# Patient Record
Sex: FEMALE | Race: WHITE | NOT HISPANIC OR LATINO | URBAN - METROPOLITAN AREA
[De-identification: names, ages, dates, MRNs, and addresses within clinical notes are randomized per-mention and may not be internally consistent; named-entity substitution may affect disease eponyms.]

---

## 2017-04-25 ENCOUNTER — OUTPATIENT (OUTPATIENT)
Dept: OUTPATIENT SERVICES | Facility: HOSPITAL | Age: 77
LOS: 1 days | Discharge: HOME | End: 2017-04-25

## 2017-04-25 DIAGNOSIS — F17.211 NICOTINE DEPENDENCE, CIGARETTES, IN REMISSION: ICD-10-CM

## 2017-06-28 DIAGNOSIS — C34.90 MALIGNANT NEOPLASM OF UNSPECIFIED PART OF UNSPECIFIED BRONCHUS OR LUNG: ICD-10-CM

## 2018-03-28 ENCOUNTER — OUTPATIENT (OUTPATIENT)
Dept: OUTPATIENT SERVICES | Facility: HOSPITAL | Age: 78
LOS: 1 days | Discharge: HOME | End: 2018-03-28

## 2018-03-28 DIAGNOSIS — Z12.31 ENCOUNTER FOR SCREENING MAMMOGRAM FOR MALIGNANT NEOPLASM OF BREAST: ICD-10-CM

## 2018-10-16 ENCOUNTER — INPATIENT (INPATIENT)
Facility: HOSPITAL | Age: 78
LOS: 3 days | Discharge: NURSING, REHAB OR LONG-TERM CARE | End: 2018-10-20
Attending: HOSPITALIST | Admitting: HOSPITALIST

## 2018-10-16 VITALS
WEIGHT: 130.07 LBS | OXYGEN SATURATION: 87 % | RESPIRATION RATE: 20 BRPM | SYSTOLIC BLOOD PRESSURE: 174 MMHG | HEIGHT: 63 IN | TEMPERATURE: 97 F | DIASTOLIC BLOOD PRESSURE: 81 MMHG | HEART RATE: 100 BPM

## 2018-10-16 LAB
ALBUMIN SERPL ELPH-MCNC: 4.3 G/DL — SIGNIFICANT CHANGE UP (ref 3.5–5.2)
ALP SERPL-CCNC: 73 U/L — SIGNIFICANT CHANGE UP (ref 30–115)
ALT FLD-CCNC: 21 U/L — SIGNIFICANT CHANGE UP (ref 0–41)
ANION GAP SERPL CALC-SCNC: 16 MMOL/L — HIGH (ref 7–14)
AST SERPL-CCNC: 18 U/L — SIGNIFICANT CHANGE UP (ref 0–41)
BILIRUB SERPL-MCNC: 0.9 MG/DL — SIGNIFICANT CHANGE UP (ref 0.2–1.2)
BUN SERPL-MCNC: 19 MG/DL — SIGNIFICANT CHANGE UP (ref 10–20)
CALCIUM SERPL-MCNC: 9.6 MG/DL — SIGNIFICANT CHANGE UP (ref 8.5–10.1)
CHLORIDE SERPL-SCNC: 98 MMOL/L — SIGNIFICANT CHANGE UP (ref 98–110)
CO2 SERPL-SCNC: 26 MMOL/L — SIGNIFICANT CHANGE UP (ref 17–32)
CREAT SERPL-MCNC: 1 MG/DL — SIGNIFICANT CHANGE UP (ref 0.7–1.5)
GAS PNL BLDA: SIGNIFICANT CHANGE UP
GLUCOSE SERPL-MCNC: 170 MG/DL — HIGH (ref 70–99)
HCT VFR BLD CALC: 45.6 % — SIGNIFICANT CHANGE UP (ref 37–47)
HGB BLD-MCNC: 14.8 G/DL — SIGNIFICANT CHANGE UP (ref 12–16)
MCHC RBC-ENTMCNC: 30.1 PG — SIGNIFICANT CHANGE UP (ref 27–31)
MCHC RBC-ENTMCNC: 32.5 G/DL — SIGNIFICANT CHANGE UP (ref 32–37)
MCV RBC AUTO: 92.9 FL — SIGNIFICANT CHANGE UP (ref 81–99)
NRBC # BLD: 0 /100 WBCS — SIGNIFICANT CHANGE UP (ref 0–0)
NT-PROBNP SERPL-SCNC: 119 PG/ML — SIGNIFICANT CHANGE UP (ref 0–300)
PLATELET # BLD AUTO: 169 K/UL — SIGNIFICANT CHANGE UP (ref 130–400)
POTASSIUM SERPL-MCNC: 3.9 MMOL/L — SIGNIFICANT CHANGE UP (ref 3.5–5)
POTASSIUM SERPL-SCNC: 3.9 MMOL/L — SIGNIFICANT CHANGE UP (ref 3.5–5)
PROT SERPL-MCNC: 6.8 G/DL — SIGNIFICANT CHANGE UP (ref 6–8)
RBC # BLD: 4.91 M/UL — SIGNIFICANT CHANGE UP (ref 4.2–5.4)
RBC # FLD: 12.7 % — SIGNIFICANT CHANGE UP (ref 11.5–14.5)
SODIUM SERPL-SCNC: 140 MMOL/L — SIGNIFICANT CHANGE UP (ref 135–146)
TROPONIN T SERPL-MCNC: 0.01 NG/ML — SIGNIFICANT CHANGE UP
WBC # BLD: 18.6 K/UL — HIGH (ref 4.8–10.8)
WBC # FLD AUTO: 18.6 K/UL — HIGH (ref 4.8–10.8)

## 2018-10-16 RX ORDER — IPRATROPIUM/ALBUTEROL SULFATE 18-103MCG
3 AEROSOL WITH ADAPTER (GRAM) INHALATION ONCE
Qty: 0 | Refills: 0 | Status: COMPLETED | OUTPATIENT
Start: 2018-10-16 | End: 2018-10-16

## 2018-10-16 RX ORDER — TETANUS TOXOID, REDUCED DIPHTHERIA TOXOID AND ACELLULAR PERTUSSIS VACCINE, ADSORBED 5; 2.5; 8; 8; 2.5 [IU]/.5ML; [IU]/.5ML; UG/.5ML; UG/.5ML; UG/.5ML
0.5 SUSPENSION INTRAMUSCULAR ONCE
Qty: 0 | Refills: 0 | Status: COMPLETED | OUTPATIENT
Start: 2018-10-16 | End: 2018-10-16

## 2018-10-16 RX ADMIN — Medication 3 MILLILITER(S): at 19:56

## 2018-10-16 RX ADMIN — Medication 125 MILLIGRAM(S): at 22:42

## 2018-10-16 RX ADMIN — TETANUS TOXOID, REDUCED DIPHTHERIA TOXOID AND ACELLULAR PERTUSSIS VACCINE, ADSORBED 0.5 MILLILITER(S): 5; 2.5; 8; 8; 2.5 SUSPENSION INTRAMUSCULAR at 19:53

## 2018-10-16 NOTE — ED PROVIDER NOTE - SEVERE SEPSIS ALERT DETAILS
Delayed presentation for sepsis : Pt presented for fall laceration hand and leg pain -  later on found to be hypoxic -  further investigation for cause  -  LA 1.9 , afebrile -   pt later on became febrile 10 -  abx started,  ivf given -

## 2018-10-16 NOTE — ED PROVIDER NOTE - CRITICAL CARE PROVIDED
direct patient care (not related to procedure)/interpretation of diagnostic studies/documentation/consult w/ pt's family directly relating to pts condition

## 2018-10-16 NOTE — ED PROVIDER NOTE - NS ED ROS FT
Constitutional: (-) fever  Eyes/ENT: (-) blurry vision, (-) epistaxis  Cardiovascular: (-) chest pain, (-) syncope  Respiratory: (-) cough, (-) shortness of breath  Gastrointestinal: (-) vomiting, (-) diarrhea  Musculoskeletal: (-) neck pain, (-) back pain, (+) joint pain  Integumentary: (+ abrasion  Neurological: (-) headache, (-) altered mental status  Psychiatric: (-) hallucinations  Allergic/Immunologic: (-) pruritus

## 2018-10-16 NOTE — ED PROVIDER NOTE - PLAN OF CARE
Other Dx: COPD Exacerbation, Fall, head injury, Forehead laceration, left hand fracture, Right Tibial Plateu fracture

## 2018-10-16 NOTE — ED PROVIDER NOTE - MEDICAL DECISION MAKING DETAILS
78yF pmhx COPD - no home o2, advair  - Dr garibay, no hospitalizations for COPD,  HLD HTn on low dose ASA presents after mechanical fall -  pt lives alone -  left oven open - turned and fell over oven door -  onto hands and knees   CHI when head hit cabinet - no LOC n o vomiting pt with pain to right knee.   no headache neck pain chest pain sob abdominal pain extremity pain, no paresthesias numbness of extremities. Alert and oriented, GCS 15.  PERRL, EOMI, no entrapment.  No raccoon or watson sign.  No hemotympanum.  laceration left forehead Neck is supple, no midline C-Spine tenderness.  CN 2-12 intact.  Motor strength and sensory response is symmetric.  CB intact.  CVS RRR.  Resp CTA b/l.  tachypnea on 2L NC, speaking clearly.  abd soft NT/ND.  No shoulder, elbow oleft 5th digit ttp .  2+ Radial pulse b/l and equal. Full ROM at all joints of b/l UE. No midline vertebral tenderness.  No rib tenderness, no crepitus. No ecchymosis to abd wall, back wall, or chest wall. Pelvis is stable. Hips non tender.  Full ROM at hips, knees and ankles.  righ tknee TTP tibia proximal No shortening, no rotation.  NVI distally. plan ct  imaging for trauma -  tdap,

## 2018-10-16 NOTE — ED ADULT NURSE NOTE - NSIMPLEMENTINTERV_GEN_ALL_ED
Implemented All Fall with Harm Risk Interventions:  [place] to call system. Call bell, personal items and telephone within reach. Instruct patient to call for assistance. Room bathroom lighting operational. Non-slip footwear when patient is off stretcher. Physically safe environment: no spills, clutter or unnecessary equipment. Stretcher in lowest position, wheels locked, appropriate side rails in place. Provide visual cue, wrist band, yellow gown, etc. Monitor gait and stability. Monitor for mental status changes and reorient to person, place, and time. Review medications for side effects contributing to fall risk. Reinforce activity limits and safety measures with patient and family. Provide visual clues: red socks.

## 2018-10-16 NOTE — ED PROVIDER NOTE - PHYSICAL EXAMINATION
Physical Exam    Vital Signs: I have reviewed the initial vital signs.  Constitutional: elderly and frail, no acute distress  Eyes: Conjunctiva pink, Sclera clear, PERRLA, EOMI.  Cardiovascular: S1 and S2, regular rate, regular rhythm, well-perfused extremities, radial pulses equal and 2+  Respiratory: unlabored respiratory effort, mild expiratory wheeze at bases b/l   Gastrointestinal: soft, non-tender abdomen, no pulsatile mass, normal bowl sounds  Musculoskeletal: supple neck, no lower extremity edema, no midline tenderness  +Right knee swelling and tenderness LROM secondary to pain. gait limited by pain.  +Left fifth finger mild swelling with FROM  Integumentary: +left forehead 2.0 cm laceration, left 5th finger abrasion and left lower leg abrasion  Neurologic: awake, alert, cranial nerves II-XII grossly intact, extremities’ motor and sensory functions grossly intact  Psychiatric: appropriate mood, appropriate affect

## 2018-10-16 NOTE — ED PROVIDER NOTE - CARE PLAN
Principal Discharge DX:	Pneumonia  Goal:	Other Dx: COPD Exacerbation, Fall, head injury, Forehead laceration, left hand fracture, Right Tibial Plateu fracture  Secondary Diagnosis:	Sepsis  Secondary Diagnosis:	Hypoxia

## 2018-10-16 NOTE — ED PROVIDER NOTE - OBJECTIVE STATEMENT
78 year old female past medical history of COPD, Hypertension and baby ASA states that she tripped and fell over opened over fell onto knees and hit left side of head. No loss of consciousness. patient was able to get up and walk with pain. patient called friend and was brought to the emergency room. patient denies chest pain, no shortness of breath, denies abd pain no nausea, vomiting, diarrhea. Pt states that she is having pain in right knee and left 5th finger.

## 2018-10-16 NOTE — ED PROVIDER NOTE - PROGRESS NOTE DETAILS
Pt became nausea and vomited x 2. Pt still hypoxic. ABG obtained and noted. Chest X-Ray no PNTX, pneumonia - will send labs and CT scan of Chest to rule out PE and of ABD. Pt now with pulse ox 78 on 2L NC sitting clam and comfortable. Methemoglobin 0.9 Pt on  50% Venti mask - 02 SAT 90-92%. . patient states feels okay. Pt pending CT scans Pt doing well still on venti mask - ct results seen - antibiotics written - d/w dr. rogers accepts admission On admission  pt well appearing,  98% on venti mask - speaking full sentences.   discussed and printed all results for patient and son - Pts Pulmonayr: GARFIELD garibay, PMD Dr wellington

## 2018-10-17 DIAGNOSIS — A41.9 SEPSIS, UNSPECIFIED ORGANISM: ICD-10-CM

## 2018-10-17 DIAGNOSIS — S69.92XA UNSPECIFIED INJURY OF LEFT WRIST, HAND AND FINGER(S), INITIAL ENCOUNTER: ICD-10-CM

## 2018-10-17 DIAGNOSIS — S82.201A UNSPECIFIED FRACTURE OF SHAFT OF RIGHT TIBIA, INITIAL ENCOUNTER FOR CLOSED FRACTURE: ICD-10-CM

## 2018-10-17 DIAGNOSIS — J44.9 CHRONIC OBSTRUCTIVE PULMONARY DISEASE, UNSPECIFIED: ICD-10-CM

## 2018-10-17 DIAGNOSIS — R09.02 HYPOXEMIA: ICD-10-CM

## 2018-10-17 DIAGNOSIS — J18.9 PNEUMONIA, UNSPECIFIED ORGANISM: ICD-10-CM

## 2018-10-17 LAB
APPEARANCE UR: ABNORMAL
BACTERIA # UR AUTO: ABNORMAL
BILIRUB UR-MCNC: NEGATIVE — SIGNIFICANT CHANGE UP
COLOR SPEC: YELLOW — SIGNIFICANT CHANGE UP
COMMENT - URINE: SIGNIFICANT CHANGE UP
DIFF PNL FLD: NEGATIVE — SIGNIFICANT CHANGE UP
EPI CELLS # UR: ABNORMAL /HPF
GLUCOSE UR QL: NEGATIVE MG/DL — SIGNIFICANT CHANGE UP
GRAN CASTS # UR COMP ASSIST: ABNORMAL /LPF
KETONES UR-MCNC: NEGATIVE — SIGNIFICANT CHANGE UP
LEUKOCYTE ESTERASE UR-ACNC: NEGATIVE — SIGNIFICANT CHANGE UP
NITRITE UR-MCNC: NEGATIVE — SIGNIFICANT CHANGE UP
PH UR: 7 — SIGNIFICANT CHANGE UP (ref 5–8)
PROT UR-MCNC: NEGATIVE MG/DL — SIGNIFICANT CHANGE UP
RBC CASTS # UR COMP ASSIST: SIGNIFICANT CHANGE UP /HPF
SP GR SPEC: 1.02 — SIGNIFICANT CHANGE UP (ref 1.01–1.03)
UROBILINOGEN FLD QL: 0.2 MG/DL — SIGNIFICANT CHANGE UP (ref 0.2–0.2)
WBC UR QL: SIGNIFICANT CHANGE UP /HPF

## 2018-10-17 RX ORDER — ACETAMINOPHEN 500 MG
975 TABLET ORAL ONCE
Qty: 0 | Refills: 0 | Status: COMPLETED | OUTPATIENT
Start: 2018-10-17 | End: 2018-10-17

## 2018-10-17 RX ORDER — IPRATROPIUM/ALBUTEROL SULFATE 18-103MCG
3 AEROSOL WITH ADAPTER (GRAM) INHALATION EVERY 6 HOURS
Qty: 0 | Refills: 0 | Status: DISCONTINUED | OUTPATIENT
Start: 2018-10-17 | End: 2018-10-20

## 2018-10-17 RX ORDER — ACETAMINOPHEN 500 MG
650 TABLET ORAL EVERY 6 HOURS
Qty: 0 | Refills: 0 | Status: DISCONTINUED | OUTPATIENT
Start: 2018-10-17 | End: 2018-10-20

## 2018-10-17 RX ORDER — METOPROLOL TARTRATE 50 MG
25 TABLET ORAL DAILY
Qty: 0 | Refills: 0 | Status: DISCONTINUED | OUTPATIENT
Start: 2018-10-17 | End: 2018-10-17

## 2018-10-17 RX ORDER — SODIUM CHLORIDE 9 MG/ML
1000 INJECTION INTRAMUSCULAR; INTRAVENOUS; SUBCUTANEOUS
Qty: 0 | Refills: 0 | Status: DISCONTINUED | OUTPATIENT
Start: 2018-10-17 | End: 2018-10-20

## 2018-10-17 RX ORDER — SIMVASTATIN 20 MG/1
10 TABLET, FILM COATED ORAL AT BEDTIME
Qty: 0 | Refills: 0 | Status: DISCONTINUED | OUTPATIENT
Start: 2018-10-17 | End: 2018-10-20

## 2018-10-17 RX ORDER — METOPROLOL TARTRATE 50 MG
0 TABLET ORAL
Qty: 0 | Refills: 0 | COMMUNITY

## 2018-10-17 RX ORDER — ASPIRIN/CALCIUM CARB/MAGNESIUM 324 MG
81 TABLET ORAL DAILY
Qty: 0 | Refills: 0 | Status: DISCONTINUED | OUTPATIENT
Start: 2018-10-17 | End: 2018-10-20

## 2018-10-17 RX ORDER — DILTIAZEM HCL 120 MG
180 CAPSULE, EXT RELEASE 24 HR ORAL DAILY
Qty: 0 | Refills: 0 | Status: DISCONTINUED | OUTPATIENT
Start: 2018-10-17 | End: 2018-10-17

## 2018-10-17 RX ORDER — DILTIAZEM HCL 120 MG
300 CAPSULE, EXT RELEASE 24 HR ORAL ONCE
Qty: 0 | Refills: 0 | Status: DISCONTINUED | OUTPATIENT
Start: 2018-10-17 | End: 2018-10-17

## 2018-10-17 RX ORDER — DILTIAZEM HCL 120 MG
300 CAPSULE, EXT RELEASE 24 HR ORAL DAILY
Qty: 0 | Refills: 0 | Status: DISCONTINUED | OUTPATIENT
Start: 2018-10-17 | End: 2018-10-20

## 2018-10-17 RX ORDER — HEPARIN SODIUM 5000 [USP'U]/ML
5000 INJECTION INTRAVENOUS; SUBCUTANEOUS EVERY 12 HOURS
Qty: 0 | Refills: 0 | Status: DISCONTINUED | OUTPATIENT
Start: 2018-10-17 | End: 2018-10-20

## 2018-10-17 RX ORDER — METOPROLOL TARTRATE 50 MG
50 TABLET ORAL DAILY
Qty: 0 | Refills: 0 | Status: DISCONTINUED | OUTPATIENT
Start: 2018-10-17 | End: 2018-10-20

## 2018-10-17 RX ADMIN — Medication 3 MILLILITER(S): at 19:27

## 2018-10-17 RX ADMIN — Medication 3 MILLILITER(S): at 07:21

## 2018-10-17 RX ADMIN — HEPARIN SODIUM 5000 UNIT(S): 5000 INJECTION INTRAVENOUS; SUBCUTANEOUS at 07:00

## 2018-10-17 RX ADMIN — SODIUM CHLORIDE 1000 MILLILITER(S): 9 INJECTION INTRAMUSCULAR; INTRAVENOUS; SUBCUTANEOUS at 03:01

## 2018-10-17 RX ADMIN — SIMVASTATIN 10 MILLIGRAM(S): 20 TABLET, FILM COATED ORAL at 21:17

## 2018-10-17 RX ADMIN — Medication 3 MILLILITER(S): at 13:37

## 2018-10-17 RX ADMIN — Medication 40 MILLIGRAM(S): at 17:46

## 2018-10-17 RX ADMIN — Medication 50 MILLIGRAM(S): at 07:00

## 2018-10-17 RX ADMIN — Medication 40 MILLIGRAM(S): at 07:00

## 2018-10-17 RX ADMIN — SODIUM CHLORIDE 1000 MILLILITER(S): 9 INJECTION INTRAMUSCULAR; INTRAVENOUS; SUBCUTANEOUS at 01:56

## 2018-10-17 RX ADMIN — Medication 975 MILLIGRAM(S): at 01:56

## 2018-10-17 RX ADMIN — HEPARIN SODIUM 5000 UNIT(S): 5000 INJECTION INTRAVENOUS; SUBCUTANEOUS at 17:46

## 2018-10-17 RX ADMIN — Medication 180 MILLIGRAM(S): at 03:08

## 2018-10-17 RX ADMIN — Medication 300 MILLIGRAM(S): at 07:00

## 2018-10-17 RX ADMIN — Medication 81 MILLIGRAM(S): at 11:26

## 2018-10-17 NOTE — ED PROCEDURE NOTE - NS ED PERI NEURO NEG
The patient/caregiver verbalized understanding of how to care for the injured extremity with splint/Pre-application: Motor, sensory, and vascular responses intact in the injured extremity./Post-application: Motor, sensory, and vascular responses intact in the injured extremity.
Pre-application: Motor, sensory, and vascular responses intact in the injured extremity./Post-application: Motor, sensory, and vascular responses intact in the injured extremity./The patient/caregiver verbalized understanding of how to care for the injured extremity with splint

## 2018-10-17 NOTE — CONSULT NOTE ADULT - ASSESSMENT
IMPRESSION: Rehab of multi  truma  rt le fx  lt ue  fx      PRECAUTIONS: [  ] Cardiac  [  ] Respiratory  [  ] Seizures [  ] Contact Isolation  [  ] Droplet Isolation  [ nwb  lt ue rt le  ] Other    Weight Bearing Status:     RECOMMENDATION:    Out of Bed to Chair     DVT/Decubiti Prophylaxis    REHAB PLAN:     [ xx  ] Bedside P/T 3-5 times a week   [   ]   Bedside O/T  2-3 times a week             [   ] No Rehab Therapy Indicated                   [   ]  Speech Therapy   Conditioning/ROM                                    ADL  Bed Mobility                                               Conditioning/ROM  Transfers                                                     Bed Mobility  Sitting /Standing Balance                         Transfers                                        Gait Training                                               Sitting/Standing Balance  Stair Training [   ]Applicable                    Home equipment Eval                                                                        Splinting  [   ] Only      GOALS:   ADL   [ x  ]   Independent                    Transfers  [  x ] Independent                          Ambulation  [ x  ] Independent     [  x ] With device                            [x ]  CG                                                         [ x  ]  CG                                                                  [   ] CG                            [    ] Min A                                                   [   ] Min A                                                              [   ] Min  A          DISCHARGE PLAN:   [   ]  Good candidate for Intensive Rehabilitation/Hospital based-4A [facility]                                             Will tolerate 3hrs Intensive Rehab Daily                                       [   xx ]  Short Term Rehab in Skilled Nursing Facility orth  f/u  c/s                                         [    ]  Home with Outpatient or VN services                                         [    ]  Possible Candidate for Intensive Hospital based Rehab

## 2018-10-17 NOTE — PHYSICAL THERAPY INITIAL EVALUATION ADULT - SPECIFY REASON(S)
As discussed with RN and agreed with patient and family at bedside, will hold PT eval at this time as patient currently on 50%VM. Will follow up and complete when indicated and tolerated.

## 2018-10-17 NOTE — H&P ADULT - HISTORY OF PRESENT ILLNESS
77yo female actually came to the ER following a fall which was found to have resulted in fractures to right leg and left hand. She also notes slowly progressive worsening in her breathing stating she used to run up the stairs but now she gets very short of breath even walking up stairs. In the ER she was found to have a fever along with hypoxia (Pulse ox in the 70's off supplemental oxygen)

## 2018-10-17 NOTE — ED PROCEDURE NOTE - NS ED PERI VASCULAR NEG
fingers/toes warm to touch/no cyanosis of extremity/no paresthesia/no swelling/capillary refill time < 2 seconds
no paresthesia/no cyanosis of extremity/capillary refill time < 2 seconds/fingers/toes warm to touch

## 2018-10-17 NOTE — ED PROCEDURE NOTE - CPROC ED TIME OUT STATEMENT1
“Patient's name, , procedure and correct site were confirmed during the [place] Timeout.”
“Patient's name, , procedure and correct site were confirmed during the [place] Timeout.”
“Patient's name, , procedure and correct site were confirmed during the [place] Timeout.”

## 2018-10-17 NOTE — H&P ADULT - NSHPLABSRESULTS_GEN_ALL_CORE
14.8   18.60 )-----------( 169      ( 16 Oct 2018 22:20 )             45.6     10-16    140  |  98  |  19  ----------------------------<  170<H>  3.9   |  26  |  1.0    Ca    9.6      16 Oct 2018 22:20    TPro  6.8  /  Alb  4.3  /  TBili  0.9  /  DBili  x   /  AST  18  /  ALT  21  /  AlkPhos  73  10-        ABG - ( 16 Oct 2018 21:42 )  pH, Arterial: 7.48  pH, Blood: x     /  pCO2: 36    /  pO2: 39    / HCO3: 27    / Base Excess: 3.3   /  SaO2: 78                Urinalysis Basic - ( 17 Oct 2018 01:30 )    Color: Yellow / Appearance: Slightly Cloudy / S.025 / pH: x  Gluc: x / Ketone: Negative  / Bili: Negative / Urobili: 0.2 mg/dL   Blood: x / Protein: Negative mg/dL / Nitrite: Negative   Leuk Esterase: Negative / RBC: 1-2 /HPF / WBC 3-5 /HPF   Sq Epi: x / Non Sq Epi: Moderate /HPF / Bacteria: Many        Lactate Trend    CARDIAC MARKERS ( 16 Oct 2018 22:20 )  x     / 0.01 ng/mL / x     / x     / x          CAPILLARY BLOOD GLUCOSE 14.8   18.60 )-----------( 169      ( 16 Oct 2018 22:20 )             45.6     10-16    140  |  98  |  19  ----------------------------<  170<H>  3.9   |  26  |  1.0    Ca    9.6      16 Oct 2018 22:20    TPro  6.8  /  Alb  4.3  /  TBili  0.9  /  DBili  x   /  AST  18  /  ALT  21  /  AlkPhos  73  10-        ABG - ( 16 Oct 2018 21:42 )  pH, Arterial: 7.48  pH, Blood: x     /  pCO2: 36    /  pO2: 39    / HCO3: 27    / Base Excess: 3.3   /  SaO2: 78        Urinalysis Basic - ( 17 Oct 2018 01:30 )    Color: Yellow / Appearance: Slightly Cloudy / S.025 / pH: x  Gluc: x / Ketone: Negative  / Bili: Negative / Urobili: 0.2 mg/dL   Blood: x / Protein: Negative mg/dL / Nitrite: Negative   Leuk Esterase: Negative / RBC: 1-2 /HPF / WBC 3-5 /HPF   Sq Epi: x / Non Sq Epi: Moderate /HPF / Bacteria: Many        Lactate Trend    CARDIAC MARKERS ( 16 Oct 2018 22:20 )  x     / 0.01 ng/mL / x     / x     / x        < from: CT Chest w/ IV Cont (10.17.18 @ 01:17) >      EXAM:  CT ABDOMEN AND PELVIS IC        EXAM:  CT CHEST IC            PROCEDURE DATE:  10/17/2018      IMPRESSION:    No pulmonary embolism.    No evidence for acute traumatic injury to the chest, abdomen or pelvis.    Trace bilateral pleural effusions. Bilateral dependent consolidative type   changes are favored to be on the basis of subsegmental atelectasis.    Relatively unchanged severe emphysema. There is new mild thickening of   the lower lobe airways which may reflect small airway   infection/inflammation.    Likely under distention versus less likely thickening of the hepatic   flexure. Although felt less likely, clinical correlation to evaluate for   colitis can be made.    NICK MARCUM M.D., ATTENDING RADIOLOGIST  This document has been electronically signed. Oct 17 2018  2:09AM        < end of copied text >

## 2018-10-17 NOTE — CONSULT NOTE ADULT - SUBJECTIVE AND OBJECTIVE BOX
77yo f s/p fall over drawer onto right knee.  complains of pain in the knee.  no other complaints.    PAST MEDICAL & SURGICAL HISTORY:  Hypertension  High blood cholesterol  Chronic obstructive pulmonary disease    MEDICATIONS  (STANDING):  ALBUTerol/ipratropium for Nebulization 3 milliLiter(s) Nebulizer every 6 hours  aspirin  chewable 81 milliGRAM(s) Oral daily  diltiazem    milliGRAM(s) Oral daily  heparin  Injectable 5000 Unit(s) SubCutaneous every 12 hours  levoFLOXacin IVPB 750 milliGRAM(s) IV Intermittent every 24 hours  methylPREDNISolone sodium succinate Injectable 40 milliGRAM(s) IV Push every 12 hours  metoprolol succinate ER 50 milliGRAM(s) Oral daily  simvastatin 10 milliGRAM(s) Oral at bedtime  sodium chloride 0.9%. 1000 milliLiter(s) (1000 mL/Hr) IV Continuous <Continuous>  sodium chloride 0.9%. 1000 milliLiter(s) (1000 mL/Hr) IV Continuous <Continuous>    MEDICATIONS  (PRN):  acetaminophen   Tablet .. 650 milliGRAM(s) Oral every 6 hours PRN Mild Pain (1 - 3)  acetaminophen   Tablet .. 650 milliGRAM(s) Oral every 6 hours PRN Mild Pain (1 - 3), Moderate Pain (4 - 6)      penicillin      PE:  on nasal canula, avss, nad    rle: in knee immobilizer, mildly ttp over medial andterior plateau, good rom, nvid    xrays: neg    ct scan: effusion, nondisplaced medial tibial plateau fx

## 2018-10-17 NOTE — ED PROCEDURE NOTE - CPROC ED POST PROC CARE GUIDE1
Keep the cast/splint/dressing clean and dry./Instructed patient/caregiver to follow-up with primary care physician./Instructed patient/caregiver regarding signs and symptoms of infection./Elevate the injured extremity as instructed./Verbal/written post procedure instructions were given to patient/caregiver.
Verbal/written post procedure instructions were given to patient/caregiver./Instructed patient/caregiver regarding signs and symptoms of infection./Elevate the injured extremity as instructed./Instructed patient/caregiver to follow-up with primary care physician./Keep the cast/splint/dressing clean and dry.
Instructed patient/caregiver regarding signs and symptoms of infection./Verbal/written post procedure instructions were given to patient/caregiver./Instructed patient/caregiver to follow-up with primary care physician./Keep the cast/splint/dressing clean and dry.

## 2018-10-17 NOTE — ED PROCEDURE NOTE - ATTENDING CONTRIBUTION TO CARE
I was present for and supervised the key and critical aspects of the procedures performed during the care of the patient.

## 2018-10-17 NOTE — CONSULT NOTE ADULT - SUBJECTIVE AND OBJECTIVE BOX
HPI:  77yo female actually came to the ER following a fall which was found to have resulted in fractures to right leg and left hand. She also notes slowly progressive worsening in her breathing stating she used to run up the stairs but now she gets very short of breath even walking up stairs. In the ER she was found to have a fever along with hypoxia (Pulse ox in the 70's off supplemental oxygen) (17 Oct 2018 03:07)    PTN  REFERRED TO ACUTE  REHAB  FOR  EVAL AND  TX   PAST MEDICAL & SURGICAL HISTORY:  Hypertension  High blood cholesterol  Chronic obstructive pulmonary disease      Hospital Course:    TODAY'S SUBJECTIVE & REVIEW OF SYMPTOMS:     Constitutional WNL   Cardio WNL   Resp WNL   GI WNL  Heme WNL  Endo WNL  Skin WNL  MSK WNL  Neuro WNL  Cognitive WNL  Psych WNL      MEDICATIONS  (STANDING):  ALBUTerol/ipratropium for Nebulization 3 milliLiter(s) Nebulizer every 6 hours  aspirin  chewable 81 milliGRAM(s) Oral daily  diltiazem    milliGRAM(s) Oral daily  heparin  Injectable 5000 Unit(s) SubCutaneous every 12 hours  levoFLOXacin IVPB 750 milliGRAM(s) IV Intermittent every 24 hours  methylPREDNISolone sodium succinate Injectable 40 milliGRAM(s) IV Push every 12 hours  metoprolol succinate ER 50 milliGRAM(s) Oral daily  simvastatin 10 milliGRAM(s) Oral at bedtime  sodium chloride 0.9%. 1000 milliLiter(s) (1000 mL/Hr) IV Continuous <Continuous>  sodium chloride 0.9%. 1000 milliLiter(s) (1000 mL/Hr) IV Continuous <Continuous>    MEDICATIONS  (PRN):  acetaminophen   Tablet .. 650 milliGRAM(s) Oral every 6 hours PRN Mild Pain (1 - 3)      FAMILY HISTORY:      Allergies    penicillin (Unknown)    Intolerances        SOCIAL HISTORY:    [  ] Etoh  [  ] Smoking  [  ] Substance abuse     Home Environment:  [ x ] Home Alone  [  ] Lives with Family  [  ] Home Health Aid    Dwelling:  [  ] Apartment  [ x ] Private House  [  ] Adult Home  [  ] Skilled Nursing Facility      [  ] Short Term  [  ] Long Term  [x] Stairs       Elevator [  ]    FUNCTIONAL STATUS PTA: (Check all that apply)  Ambulation: [   ]Independent    [  ] Dependent     [  ] Non-Ambulatory  Assistive Device: [  ] SA Cane  [  ]  Q Cane  [  ] Walker  [  ]  Wheelchair  ADL : [  ] Independent  [  ]  Dependent       Vital Signs Last 24 Hrs  T(C): 36.1 (17 Oct 2018 04:56), Max: 38.5 (17 Oct 2018 01:28)  T(F): 97 (17 Oct 2018 04:56), Max: 101.3 (17 Oct 2018 01:28)  HR: 109 (17 Oct 2018 04:56) (96 - 112)  BP: 161/74 (17 Oct 2018 04:56) (135/67 - 175/87)  BP(mean): --  RR: 16 (17 Oct 2018 04:56) (16 - 20)  SpO2: 93% (17 Oct 2018 08:16) (87% - 93%)      PHYSICAL EXAM: Alert & Oriented X3  GENERAL: NAD, well-groomed, well-developed  HEAD:  Atraumatic, Normocephalic  EYES: EOMI, PERRLA, conjunctiva and sclera clear  NECK: Supple, No JVD, Normal thyroid  CHEST/LUNG: Clear to percussion bilaterally; No rales, rhonchi, wheezing, or rubs  HEART: Regular rate and rhythm; No murmurs, rubs, or gallops  ABDOMEN: Soft, Nontender, Nondistended; Bowel sounds present  EXTREMITIES:  2+ Peripheral Pulses, No clubbing, cyanosis, or edema    NERVOUS SYSTEM:  Cranial Nerves 2-12 intact [  x] Abnormal  [  ]  ROM: WFL all extremities [  ]  Abnormal [x ]  Motor Strength: WFL all extremities  [  ]  Abnormal [x ]  Sensation: intact to light touch [  ] Abnormal [ x ]  Reflexes: Symmetric x[ x ]  Abnormal [  ]    FUNCTIONAL STATUS:  Bed Mobility: Independent [  ]  Supervision [  ]  Needs Assistance [ x ]  N/A [  ]  Transfers: Independent [  ]  Supervision [  ]  Needs Assistance [x  ]  N/A [  ]   Ambulation: Independent [  ]  Supervision [  ]  Needs Assistance [ x ]  N/A [  ]  ADL: Independent [ x ] Requires Assistance [  ] N/A [  ]  ee  pt  ie  notes  s  LABS:                        14.8   18.60 )-----------( 169      ( 16 Oct 2018 22:20 )             45.6     10-16    140  |  98  |  19  ----------------------------<  170<H>  3.9   |  26  |  1.0    Ca    9.6      16 Oct 2018 22:20    TPro  6.8  /  Alb  4.3  /  TBili  0.9  /  DBili  x   /  AST  18  /  ALT  21  /  AlkPhos  73  10-16      Urinalysis Basic - ( 17 Oct 2018 01:30 )    Color: Yellow / Appearance: Slightly Cloudy / S.025 / pH: x  Gluc: x / Ketone: Negative  / Bili: Negative / Urobili: 0.2 mg/dL   Blood: x / Protein: Negative mg/dL / Nitrite: Negative   Leuk Esterase: Negative / RBC: 1-2 /HPF / WBC 3-5 /HPF   Sq Epi: x / Non Sq Epi: Moderate /HPF / Bacteria: Many        RADIOLOGY & ADDITIONAL STUDIES:< from: Xray Pelvis AP only (10.16.18 @ 20:06) >    There is no evidence of acute fracture or dislocation. Alignment is   normal. No inadvertent radiopaque foreign body is identified.    Impression:    No evidence of acute osseous abnormality.      < end of copied text >      Assesment:

## 2018-10-17 NOTE — CONSULT NOTE ADULT - ASSESSMENT
r medial tibial plateau fx    nwb in knee immobilizer  may start rom  recommend marianna brace  fu dr bhakta in 2 weeks for reevaluation

## 2018-10-17 NOTE — CONSULT NOTE ADULT - SUBJECTIVE AND OBJECTIVE BOX
TESSY ALANIZ    Female    Patient is a 78y old  Female who presents with a chief complaint of copd, leg fracture (17 Oct 2018 09:37)      HPI:  79yo female actually came to the ER following a fall which was found to have resulted in fractures to right leg and left hand. She also notes slowly progressive worsening in her breathing stating she used to run up the stairs but now she gets very short of breath even walking up stairs. In the ER she was found to have a fever along with hypoxia (Pulse ox in the 70's off supplemental oxygen) (17 Oct 2018 03:07)      Allergies    penicillin (Unknown)    Daily Height in cm: 160.02 (17 Oct 2018 04:15)    Daily     I    Marital Status:  ( x  )    (   ) Single    (   )    (  )   Occupation:   Lives with: (  ) alone  (  ) children   (x  ) spouse   (  ) parents  (  ) other  Recent Travel:     Substance Use (street drugs): ( x ) never used  (  ) other:  Tobacco Usage:  (   ) never smoked   (   ) former smoker   (   )x current smoker  (     ) pack year  Alcohol Usage:        HEALTH ISSUES - PROBLEM Dx:  Hand injuries, left, initial encounter: Hand injuries, left, initial encounter  Right tibial fracture: Right tibial fracture  Sepsis: Sepsis  Hypoxia: Hypoxia  Chronic obstructive pulmonary disease, unspecified COPD type: Chronic obstructive pulmonary disease, unspecified COPD type  Pneumonia: Pneumonia          Vital Signs Last 24 Hrs  T(C): 36.1 (17 Oct 2018 04:56), Max: 38.5 (17 Oct 2018 01:28)  T(F): 97 (17 Oct 2018 04:56), Max: 101.3 (17 Oct 2018 01:28)  HR: 109 (17 Oct 2018 04:56) (96 - 112)  BP: 161/74 (17 Oct 2018 04:56) (135/67 - 175/87)  BP(mean): --  RR: 16 (17 Oct 2018 04:56) (16 - 20)  SpO2: 93% (17 Oct 2018 08:16) (87% - 93%)    REVIEW OF SYSTEMS:  CONSTITUTIONAL: No fever, weight loss, or fatigue  EYES: No eye pain, visual disturbances, or discharge  NECK: No pain or stiffness  RESPIRATORY: +cough, ++wheezing, chills or hemoptysis; +shortness of breath  CARDIOVASCULAR: No chest pain, palpitations, dizziness, or leg swelling  GASTROINTESTINAL: No abdominal or epigastric pain. No nausea, vomiting, or hematemesis; No diarrhea or constipation. No melena or hematochezia.  GENITOURINARY: No dysuria, frequency, hematuria, or incontinence  NEUROLOGICAL: No headaches, memory loss, loss of strength, numbness, or tremors  MUSCULOSKELETAL: No joint pain or swelling; No muscle, back, or extremity pain                                  14.8   18.60 )-----------( 169      ( 16 Oct 2018 22:20 )             45.6       10-16    140  |  98  |  19  ----------------------------<  170<H>  3.9   |  26  |  1.0    Ca    9.6      16 Oct 2018 22:20    TPro  6.8  /  Alb  4.3  /  TBili  0.9  /  DBili  x   /  AST  18  /  ALT  21  /  AlkPhos  73  10-      CARDIAC MARKERS ( 16 Oct 2018 22:20 )  x     / 0.01 ng/mL / x     / x     / x            LIVER FUNCTIONS - ( 16 Oct 2018 22:20 )  Alb: 4.3 g/dL / Pro: 6.8 g/dL / ALK PHOS: 73 U/L / ALT: 21 U/L / AST: 18 U/L / GGT: x             Urinalysis Basic - ( 17 Oct 2018 01:30 )    Color: Yellow / Appearance: Slightly Cloudy / S.025 / pH: x  Gluc: x / Ketone: Negative  / Bili: Negative / Urobili: 0.2 mg/dL   Blood: x / Protein: Negative mg/dL / Nitrite: Negative   Leuk Esterase: Negative / RBC: 1-2 /HPF / WBC 3-5 /HPF   Sq Epi: x / Non Sq Epi: Moderate /HPF / Bacteria: Many        ABG - ( 16 Oct 2018 21:42 )  pH, Arterial: 7.48  pH, Blood: x     /  pCO2: 36    /  pO2: 39    / HCO3: 27    / Base Excess: 3.3   /  SaO2: 78                      Radiology: Radiology personally reviewed.  CT IMPRESSION:    No pulmonary embolism.  No evidence for acute traumatic injury to the chest, abdomen or pelvis.  Trace bilateral pleural effusions. Bilateral dependent consolidative type   changes are favored to be on the basis of subsegmental atelectasis.  Relatively unchanged severe emphysema. There is new mild thickening of   the lower lobe airways which may reflect small airway   infection/inflammation.  Likely under distention versus less likely thickening of the hepatic   flexure. Although felt less likely, clinical correlation to evaluate for   colitis can be made.              MEDICATIONS  (STANDING):  ALBUTerol/ipratropium for Nebulization 3 milliLiter(s) Nebulizer every 6 hours  aspirin  chewable 81 milliGRAM(s) Oral daily  diltiazem    milliGRAM(s) Oral daily  heparin  Injectable 5000 Unit(s) SubCutaneous every 12 hours  levoFLOXacin IVPB 750 milliGRAM(s) IV Intermittent every 24 hours  methylPREDNISolone sodium succinate Injectable 40 milliGRAM(s) IV Push every 12 hours  metoprolol succinate ER 50 milliGRAM(s) Oral daily  simvastatin 10 milliGRAM(s) Oral at bedtime  sodium chloride 0.9%. 1000 milliLiter(s) (1000 mL/Hr) IV Continuous <Continuous>  sodium chloride 0.9%. 1000 milliLiter(s) (1000 mL/Hr) IV Continuous <Continuous>    MEDICATIONS  (PRN):  acetaminophen   Tablet .. 650 milliGRAM(s) Oral every 6 hours PRN Mild Pain (1 - 3)  acetaminophen   Tablet .. 650 milliGRAM(s) Oral every 6 hours PRN Mild Pain (1 - 3), Moderate Pain (4 - 6)    PHYSICAL EXAM:  GENERAL: NAD, well-groomed, well-developed  HEAD:  Atraumatic, Normocephalic  EYES: EOMI, PERRLA, conjunctiva and sclera clear  ENMT: No tonsillar erythema, exudates, or enlargement; Moist mucous membranes, Good dentition, No lesions  NECK: Supple, No JVD, Normal thyroid  NERVOUS SYSTEM:  Alert & Oriented X3,  Motor Strength 5/5 B/L upper and lower extremities  CHEST/LUNG:decreased bilaterally; +rales,no rhonchi, +wheezing, or rubs  HEART: Regular rate and rhythm; No murmurs, rubs, or gallops  ABDOMEN: Soft, Nontender, Nondistended; Bowel sounds present  EXTREMITIES:   No clubbing, cyanosis, or edema, full ROM  LYMPH: No lymphadenopathy noted in cervical or supraclavicular area  SKIN: No rashes or lesions observed SEMAJJAYTESSY BEAN    Female    Patient is a 78y old  Female who presents with a chief complaint of copd, leg fracture (17 Oct 2018 09:37)      HPI:  79yo female actually came to the ER following a fall which was found to have resulted in fractures to right tibial plateau and left small finger. She also notes slowly progressive worsening in her breathing stating she used to run up the stairs but now she gets very short of breath even walking up stairs. In the ER she was found to have a fever along with hypoxia (Pulse ox in the 70's off supplemental oxygen) (17 Oct 2018 03:07)      Allergies    penicillin (Unknown)    Daily Height in cm: 160.02 (17 Oct 2018 04:15)    Daily     I    Marital Status:  ( x  )    (   ) Single    (   )    (  )   Occupation:   Lives with: (  ) alone  (  ) children   (x  ) spouse   (  ) parents  (  ) other  Recent Travel:     Substance Use (street drugs): ( x ) never used  (  ) other:  Tobacco Usage:  (   ) never smoked   (  x ) former smoker   (   ) current smoker  (     ) pack year  Alcohol Usage:        HEALTH ISSUES - PROBLEM Dx:  Hand injuries, left, initial encounter: Hand injuries, left, initial encounter  Right tibial fracture: Right tibial fracture  Sepsis: Sepsis  Hypoxia: Hypoxia  Chronic obstructive pulmonary disease, unspecified COPD type: Chronic obstructive pulmonary disease, unspecified COPD type  Pneumonia: Pneumonia          Vital Signs Last 24 Hrs  T(C): 36.1 (17 Oct 2018 04:56), Max: 38.5 (17 Oct 2018 01:28)  T(F): 97 (17 Oct 2018 04:56), Max: 101.3 (17 Oct 2018 01:28)  HR: 109 (17 Oct 2018 04:56) (96 - 112)  BP: 161/74 (17 Oct 2018 04:56) (135/67 - 175/87)  BP(mean): --  RR: 16 (17 Oct 2018 04:56) (16 - 20)  SpO2: 93% (17 Oct 2018 08:16) (87% - 93%)    REVIEW OF SYSTEMS:  CONSTITUTIONAL: No fever, weight loss, or fatigue  EYES: No eye pain, visual disturbances, or discharge  NECK: No pain or stiffness  RESPIRATORY: +cough, ++wheezing, chills or hemoptysis; +shortness of breath  CARDIOVASCULAR: No chest pain, palpitations, dizziness, or leg swelling  GASTROINTESTINAL: No abdominal or epigastric pain. No nausea, vomiting, or hematemesis; No diarrhea or constipation. No melena or hematochezia.  GENITOURINARY: No dysuria, frequency, hematuria, or incontinence  NEUROLOGICAL: No headaches, memory loss, loss of strength, numbness, or tremors  MUSCULOSKELETAL: No joint pain or swelling; No muscle, back, or extremity pain                                  14.8   18.60 )-----------( 169      ( 16 Oct 2018 22:20 )             45.6       10-16    140  |  98  |  19  ----------------------------<  170<H>  3.9   |  26  |  1.0    Ca    9.6      16 Oct 2018 22:20    TPro  6.8  /  Alb  4.3  /  TBili  0.9  /  DBili  x   /  AST  18  /  ALT  21  /  AlkPhos  73  10-      CARDIAC MARKERS ( 16 Oct 2018 22:20 )  x     / 0.01 ng/mL / x     / x     / x            LIVER FUNCTIONS - ( 16 Oct 2018 22:20 )  Alb: 4.3 g/dL / Pro: 6.8 g/dL / ALK PHOS: 73 U/L / ALT: 21 U/L / AST: 18 U/L / GGT: x             Urinalysis Basic - ( 17 Oct 2018 01:30 )    Color: Yellow / Appearance: Slightly Cloudy / S.025 / pH: x  Gluc: x / Ketone: Negative  / Bili: Negative / Urobili: 0.2 mg/dL   Blood: x / Protein: Negative mg/dL / Nitrite: Negative   Leuk Esterase: Negative / RBC: 1-2 /HPF / WBC 3-5 /HPF   Sq Epi: x / Non Sq Epi: Moderate /HPF / Bacteria: Many        ABG - ( 16 Oct 2018 21:42 )  pH, Arterial: 7.48  pH, Blood: x     /  pCO2: 36    /  pO2: 39    / HCO3: 27    / Base Excess: 3.3   /  SaO2: 78                      Radiology: Radiology personally reviewed.  CT IMPRESSION:    No pulmonary embolism.  No evidence for acute traumatic injury to the chest, abdomen or pelvis.  Trace bilateral pleural effusions. Bilateral dependent consolidative type   changes are favored to be on the basis of subsegmental atelectasis.  Relatively unchanged severe emphysema. There is new mild thickening of   the lower lobe airways which may reflect small airway   infection/inflammation.  Likely under distention versus less likely thickening of the hepatic   flexure. Although felt less likely, clinical correlation to evaluate for   colitis can be made.              MEDICATIONS  (STANDING):  ALBUTerol/ipratropium for Nebulization 3 milliLiter(s) Nebulizer every 6 hours  aspirin  chewable 81 milliGRAM(s) Oral daily  diltiazem    milliGRAM(s) Oral daily  heparin  Injectable 5000 Unit(s) SubCutaneous every 12 hours  levoFLOXacin IVPB 750 milliGRAM(s) IV Intermittent every 24 hours  methylPREDNISolone sodium succinate Injectable 40 milliGRAM(s) IV Push every 12 hours  metoprolol succinate ER 50 milliGRAM(s) Oral daily  simvastatin 10 milliGRAM(s) Oral at bedtime  sodium chloride 0.9%. 1000 milliLiter(s) (1000 mL/Hr) IV Continuous <Continuous>  sodium chloride 0.9%. 1000 milliLiter(s) (1000 mL/Hr) IV Continuous <Continuous>    MEDICATIONS  (PRN):  acetaminophen   Tablet .. 650 milliGRAM(s) Oral every 6 hours PRN Mild Pain (1 - 3)  acetaminophen   Tablet .. 650 milliGRAM(s) Oral every 6 hours PRN Mild Pain (1 - 3), Moderate Pain (4 - 6)    PHYSICAL EXAM:  GENERAL: NAD, well-groomed, well-developed  HEAD:  Atraumatic, Normocephalic  EYES: EOMI, PERRLA, conjunctiva and sclera clear  ENMT: No tonsillar erythema, exudates, or enlargement; Moist mucous membranes, Good dentition, No lesions  NECK: Supple, No JVD, Normal thyroid  NERVOUS SYSTEM:  Alert & Oriented X3,  Motor Strength 5/5 B/L upper and lower extremities  CHEST/LUNG:decreased bilaterally; +rales,no rhonchi, +wheezing, or rubs  HEART: Regular rate and rhythm; No murmurs, rubs, or gallops  ABDOMEN: Soft, Nontender, Nondistended; Bowel sounds present  EXTREMITIES:   No clubbing, cyanosis, or edema, full ROM  LYMPH: No lymphadenopathy noted in cervical or supraclavicular area  SKIN: No rashes or lesions observed

## 2018-10-17 NOTE — CONSULT NOTE ADULT - ASSESSMENT
Impression:  Acute chronic COPD with exacerbation  Severe emphysema at baseline  cannot R/O an early pneumonia or bronchitis    PLAN:  Check O2 sat on NC, record, continue O2 as necessary to maintain sats > 90%  Continue IV solumedrol    bronchodilator treatments ATC and PRN  complete course of antibiotics  NIPPV as needed  ORTHO eval  GI and DVT prophylaxis  pulmonary toilet  Monitor clinically, convert to oral prednisone as clinical improvement allows Impression:  Acute chronic COPD with exacerbation  Severe emphysema at baseline  cannot R/O an early pneumonia or bronchitis  tibial plateau fx  Left small finger fx    PLAN:  Check O2 sat on NC, record, continue O2 as necessary to maintain sats > 90%  Continue IV solumedrol    bronchodilator treatments ATC and PRN  complete course of antibiotics  NIPPV as needed  ORTHO eval may need Kirchener wire in pinky finger though high risk for OR at this point due to breathing  GI and DVT prophylaxis  pulmonary toilet  Monitor clinically, convert to oral prednisone as clinical improvement allows

## 2018-10-18 DIAGNOSIS — E46 UNSPECIFIED PROTEIN-CALORIE MALNUTRITION: ICD-10-CM

## 2018-10-18 LAB
ANION GAP SERPL CALC-SCNC: 15 MMOL/L — HIGH (ref 7–14)
BUN SERPL-MCNC: 24 MG/DL — HIGH (ref 10–20)
CALCIUM SERPL-MCNC: 8.5 MG/DL — SIGNIFICANT CHANGE UP (ref 8.5–10.1)
CHLORIDE SERPL-SCNC: 102 MMOL/L — SIGNIFICANT CHANGE UP (ref 98–110)
CO2 SERPL-SCNC: 22 MMOL/L — SIGNIFICANT CHANGE UP (ref 17–32)
CREAT SERPL-MCNC: 0.9 MG/DL — SIGNIFICANT CHANGE UP (ref 0.7–1.5)
CULTURE RESULTS: SIGNIFICANT CHANGE UP
GLUCOSE SERPL-MCNC: 128 MG/DL — HIGH (ref 70–99)
HCT VFR BLD CALC: 39.2 % — SIGNIFICANT CHANGE UP (ref 37–47)
HGB BLD-MCNC: 13 G/DL — SIGNIFICANT CHANGE UP (ref 12–16)
MCHC RBC-ENTMCNC: 30.1 PG — SIGNIFICANT CHANGE UP (ref 27–31)
MCHC RBC-ENTMCNC: 33.2 G/DL — SIGNIFICANT CHANGE UP (ref 32–37)
MCV RBC AUTO: 90.7 FL — SIGNIFICANT CHANGE UP (ref 81–99)
NRBC # BLD: 0 /100 WBCS — SIGNIFICANT CHANGE UP (ref 0–0)
PLATELET # BLD AUTO: 121 K/UL — LOW (ref 130–400)
POTASSIUM SERPL-MCNC: 4.5 MMOL/L — SIGNIFICANT CHANGE UP (ref 3.5–5)
POTASSIUM SERPL-SCNC: 4.5 MMOL/L — SIGNIFICANT CHANGE UP (ref 3.5–5)
RBC # BLD: 4.32 M/UL — SIGNIFICANT CHANGE UP (ref 4.2–5.4)
RBC # FLD: 13 % — SIGNIFICANT CHANGE UP (ref 11.5–14.5)
SODIUM SERPL-SCNC: 139 MMOL/L — SIGNIFICANT CHANGE UP (ref 135–146)
SPECIMEN SOURCE: SIGNIFICANT CHANGE UP
WBC # BLD: 19.03 K/UL — HIGH (ref 4.8–10.8)
WBC # FLD AUTO: 19.03 K/UL — HIGH (ref 4.8–10.8)

## 2018-10-18 RX ADMIN — HEPARIN SODIUM 5000 UNIT(S): 5000 INJECTION INTRAVENOUS; SUBCUTANEOUS at 05:25

## 2018-10-18 RX ADMIN — Medication 3 MILLILITER(S): at 07:44

## 2018-10-18 RX ADMIN — Medication 81 MILLIGRAM(S): at 12:09

## 2018-10-18 RX ADMIN — HEPARIN SODIUM 5000 UNIT(S): 5000 INJECTION INTRAVENOUS; SUBCUTANEOUS at 16:47

## 2018-10-18 RX ADMIN — Medication 40 MILLIGRAM(S): at 05:25

## 2018-10-18 RX ADMIN — Medication 3 MILLILITER(S): at 02:56

## 2018-10-18 RX ADMIN — Medication 3 MILLILITER(S): at 19:50

## 2018-10-18 RX ADMIN — Medication 300 MILLIGRAM(S): at 06:43

## 2018-10-18 RX ADMIN — Medication 50 MILLIGRAM(S): at 05:25

## 2018-10-18 RX ADMIN — SIMVASTATIN 10 MILLIGRAM(S): 20 TABLET, FILM COATED ORAL at 22:20

## 2018-10-18 NOTE — PHYSICAL THERAPY INITIAL EVALUATION ADULT - IMPAIRMENTS CONTRIBUTING TO GAIT DEVIATIONS, PT EVAL
impaired postural control/impaired balance/narrow base of support/decreased endurance/decreased strength

## 2018-10-18 NOTE — PHYSICAL THERAPY INITIAL EVALUATION ADULT - MANUAL MUSCLE TESTING RESULTS, REHAB EVAL
RUE/LLE ms strength grossly graded 3+ to 4-/5; LUE/RLE 3- to 3/5 except for (L) ring and little finger kept in splint

## 2018-10-18 NOTE — PROGRESS NOTE ADULT - ASSESSMENT
Impression:  Acute chronic COPD with exacerbation  Severe emphysema at baseline  cannot R/O an early pneumonia or bronchitis  tibial plateau fx  Left small finger fx    PLAN:  Check O2 sat on NC, record, continue O2 as necessary to maintain sats > 90%  taper IV solumedrol    bronchodilator treatments ATC and PRN  complete course of antibiotics  NIPPV as needed  ORTHO eval read  GI and DVT prophylaxis  pulmonary toilet    OOB NWB ambulate per ORtho  Needs SNF lives alone bathroom upstairs  Monitor clinically, convert to oral prednisone as clinical improvement allows

## 2018-10-18 NOTE — PHYSICAL THERAPY INITIAL EVALUATION ADULT - GAIT DEVIATIONS NOTED, PT EVAL
decreased jose/decreased step length/decreased weight-shifting ability/stooped posture, dec heel strike/pushoff

## 2018-10-18 NOTE — PHYSICAL THERAPY INITIAL EVALUATION ADULT - IMPAIRED TRANSFERS: SIT/STAND, REHAB EVAL
impaired postural control/decreased strength/narrow base of support/impaired balance/decreased endurance

## 2018-10-18 NOTE — PHYSICAL THERAPY INITIAL EVALUATION ADULT - GENERAL OBSERVATIONS, REHAB EVAL
09:55-10:25 Chart reviewed. Pt encountered semireclined in bed,  may be seen by Physical Therapist as confirmed with Nurse. Patient denied pain and would like to get up now; +O2 via NC; +(R) knee immobilizer; +(L) hand splint/Ace wrap for (L) fifth finger

## 2018-10-18 NOTE — PHYSICAL THERAPY INITIAL EVALUATION ADULT - ACTIVE RANGE OF MOTION EXAMINATION, REHAB EVAL
no Active ROM deficits were identified/except for (R) knee required AAROM to complete but no c/o pian and (L) lateral hand kept in splint

## 2018-10-19 LAB
ALBUMIN SERPL ELPH-MCNC: 3.5 G/DL — SIGNIFICANT CHANGE UP (ref 3.5–5.2)
ALP SERPL-CCNC: 72 U/L — SIGNIFICANT CHANGE UP (ref 30–115)
ALT FLD-CCNC: 27 U/L — SIGNIFICANT CHANGE UP (ref 0–41)
ANION GAP SERPL CALC-SCNC: 14 MMOL/L — SIGNIFICANT CHANGE UP (ref 7–14)
AST SERPL-CCNC: 22 U/L — SIGNIFICANT CHANGE UP (ref 0–41)
BILIRUB SERPL-MCNC: 0.5 MG/DL — SIGNIFICANT CHANGE UP (ref 0.2–1.2)
BUN SERPL-MCNC: 26 MG/DL — HIGH (ref 10–20)
CALCIUM SERPL-MCNC: 8.6 MG/DL — SIGNIFICANT CHANGE UP (ref 8.5–10.1)
CHLORIDE SERPL-SCNC: 106 MMOL/L — SIGNIFICANT CHANGE UP (ref 98–110)
CO2 SERPL-SCNC: 23 MMOL/L — SIGNIFICANT CHANGE UP (ref 17–32)
CREAT SERPL-MCNC: 0.9 MG/DL — SIGNIFICANT CHANGE UP (ref 0.7–1.5)
GLUCOSE SERPL-MCNC: 135 MG/DL — HIGH (ref 70–99)
HCT VFR BLD CALC: 43.4 % — SIGNIFICANT CHANGE UP (ref 37–47)
HGB BLD-MCNC: 14.2 G/DL — SIGNIFICANT CHANGE UP (ref 12–16)
MCHC RBC-ENTMCNC: 30.1 PG — SIGNIFICANT CHANGE UP (ref 27–31)
MCHC RBC-ENTMCNC: 32.7 G/DL — SIGNIFICANT CHANGE UP (ref 32–37)
MCV RBC AUTO: 92.1 FL — SIGNIFICANT CHANGE UP (ref 81–99)
NRBC # BLD: 0 /100 WBCS — SIGNIFICANT CHANGE UP (ref 0–0)
PLATELET # BLD AUTO: 154 K/UL — SIGNIFICANT CHANGE UP (ref 130–400)
POTASSIUM SERPL-MCNC: 4.4 MMOL/L — SIGNIFICANT CHANGE UP (ref 3.5–5)
POTASSIUM SERPL-SCNC: 4.4 MMOL/L — SIGNIFICANT CHANGE UP (ref 3.5–5)
PROT SERPL-MCNC: 6.2 G/DL — SIGNIFICANT CHANGE UP (ref 6–8)
RBC # BLD: 4.71 M/UL — SIGNIFICANT CHANGE UP (ref 4.2–5.4)
RBC # FLD: 13.2 % — SIGNIFICANT CHANGE UP (ref 11.5–14.5)
SODIUM SERPL-SCNC: 143 MMOL/L — SIGNIFICANT CHANGE UP (ref 135–146)
WBC # BLD: 19.47 K/UL — HIGH (ref 4.8–10.8)
WBC # FLD AUTO: 19.47 K/UL — HIGH (ref 4.8–10.8)

## 2018-10-19 RX ADMIN — HEPARIN SODIUM 5000 UNIT(S): 5000 INJECTION INTRAVENOUS; SUBCUTANEOUS at 05:23

## 2018-10-19 RX ADMIN — Medication 81 MILLIGRAM(S): at 11:25

## 2018-10-19 RX ADMIN — Medication 3 MILLILITER(S): at 13:40

## 2018-10-19 RX ADMIN — SIMVASTATIN 10 MILLIGRAM(S): 20 TABLET, FILM COATED ORAL at 22:00

## 2018-10-19 RX ADMIN — Medication 3 MILLILITER(S): at 20:35

## 2018-10-19 RX ADMIN — Medication 50 MILLIGRAM(S): at 05:23

## 2018-10-19 RX ADMIN — Medication 3 MILLILITER(S): at 03:15

## 2018-10-19 RX ADMIN — Medication 300 MILLIGRAM(S): at 05:23

## 2018-10-19 RX ADMIN — HEPARIN SODIUM 5000 UNIT(S): 5000 INJECTION INTRAVENOUS; SUBCUTANEOUS at 17:42

## 2018-10-19 RX ADMIN — Medication 40 MILLIGRAM(S): at 05:23

## 2018-10-19 RX ADMIN — Medication 3 MILLILITER(S): at 08:38

## 2018-10-19 NOTE — PROGRESS NOTE ADULT - PROBLEM SELECTOR PLAN 5
ortho consult appreciated   NWB on the right leg   STR planned
ortho consult appreciated   NWB on the right leg   STR planned

## 2018-10-19 NOTE — PROGRESS NOTE ADULT - PROBLEM SELECTOR PLAN 3
off venturi mask, on nasal cannula   not on home oxgyen
off venturi mask, on nasal cannula   not on home oxgyen  will need long term home oxygen

## 2018-10-19 NOTE — PROGRESS NOTE ADULT - SUBJECTIVE AND OBJECTIVE BOX
Patient is a 78y old  Female who presents with a chief complaint of copd, leg fracture (17 Oct 2018 14:06)        Interval Events: No overnight events. Much better but not normal.    REVIEW OF SYSTEMS:  Constitutional: No fevers or chills. No weight loss. No fatigue or generalized malaise.  Eyes: No itching or discharge from the eyes  ENT: No ear pain. No ear discharge. No nasal congestion. No post nasal drip. No epistaxis. No throat pain. No sore throat. No difficulty swallowing.   CV: No chest pain. No palpitations. No lightheadedness or dizziness.   Resp: No dyspnea at rest. +dyspnea on exertion. No orthopnea. +wheezing. No cough. No stridor. No sputum production. No chest pain with respiration.  GI: No nausea. No vomiting. No diarrhea.  MSK: No joint pain or pain in any extremities  Integumentary: No skin lesions. No pedal edema.  Neurological: No gross motor weakness. No sensory changes.      OBJECTIVE:  ICU Vital Signs Last 24 Hrs  T(C): 36.6 (18 Oct 2018 06:09), Max: 37.1 (17 Oct 2018 22:10)  T(F): 97.8 (18 Oct 2018 06:09), Max: 98.7 (17 Oct 2018 22:10)  HR: 99 (18 Oct 2018 06:09) (95 - 110)  BP: 130/60 (18 Oct 2018 06:09) (130/60 - 140/65)    RR: 16 (18 Oct 2018 06:09) (16 - 16)  SpO2: 95% (18 Oct 2018 07:53) (95% - 95%)        PHYSICAL EXAM:  General: Awake, alert, oriented X 3.   HEENT: Atraumatic, normocephalic.                 Mallampatti Grade                 No nasal congestion.                No tonsillar or pharyngeal exudates.  Lymph Nodes: No palpable lymphadenopathy neck, supraclavicular region  Neck: No JVD. No carotid bruit, no thyromegaly  Respiratory: Normal chest expansion                         Normal percussion                         decreased equal air entry                         mild wheeze, no rhonchi or rales.  Cardiovascular: S1 S2 normal. No murmurs, rubs or gallops.   Abdomen: Soft, non-tender, non-distended. No organomegaly.  Extremities: Warm to touch. Peripheral pulse palpable. No pedal edema.   Skin: No rashes or skin lesions, warm dry  Neurological: Motor and sensory examination equal and normal in all four extremities.  Psychiatry: Appropriate mood and affect.    HOSPITAL MEDICATIONS:  MEDICATIONS  (STANDING):  ALBUTerol/ipratropium for Nebulization 3 milliLiter(s) Nebulizer every 6 hours  aspirin  chewable 81 milliGRAM(s) Oral daily  diltiazem    milliGRAM(s) Oral daily  heparin  Injectable 5000 Unit(s) SubCutaneous every 12 hours  levoFLOXacin IVPB 750 milliGRAM(s) IV Intermittent every 24 hours  methylPREDNISolone sodium succinate Injectable 40 milliGRAM(s) IV Push every 12 hours  metoprolol succinate ER 50 milliGRAM(s) Oral daily  simvastatin 10 milliGRAM(s) Oral at bedtime  sodium chloride 0.9%. 1000 milliLiter(s) (1000 mL/Hr) IV Continuous <Continuous>  sodium chloride 0.9%. 1000 milliLiter(s) (1000 mL/Hr) IV Continuous <Continuous>    MEDICATIONS  (PRN):  acetaminophen   Tablet .. 650 milliGRAM(s) Oral every 6 hours PRN Mild Pain (1 - 3)  acetaminophen   Tablet .. 650 milliGRAM(s) Oral every 6 hours PRN Mild Pain (1 - 3), Moderate Pain (4 - 6)      LABS:                        13.0   19.03 )-----------( 121      ( 18 Oct 2018 06:29 )             39.2     10-18    139  |  102  |  24<H>  ----------------------------<  128<H>  4.5   |  22  |  0.9    Ca    8.5      18 Oct 2018 06:29    TPro  6.8  /  Alb  4.3  /  TBili  0.9  /  DBili  x   /  AST  18  /  ALT  21  /  AlkPhos  73  10-16      Urinalysis Basic - ( 17 Oct 2018 01:30 )    Color: Yellow / Appearance: Slightly Cloudy / S.025 / pH: x  Gluc: x / Ketone: Negative  / Bili: Negative / Urobili: 0.2 mg/dL   Blood: x / Protein: Negative mg/dL / Nitrite: Negative   Leuk Esterase: Negative / RBC: 1-2 /HPF / WBC 3-5 /HPF   Sq Epi: x / Non Sq Epi: Moderate /HPF / Bacteria: Many      Arterial Blood Gas:  10-16 @ 21:42  7.48/36/39/27/78/3.3  ABG lactate: --            ABG - ( 16 Oct 2018 21:42 )  pH, Arterial: 7.48  pH, Blood: x     /  pCO2: 36    /  pO2: 39    / HCO3: 27    / Base Excess: 3.3   /  SaO2: 78                  RADIOLOGY:Radiology personally reviewed.
Patient is a 78y old  Female who presents with a chief complaint of copd, leg fracture (19 Oct 2018 12:19)        Interval Events: No overnight events. Feeling better.    REVIEW OF SYSTEMS:  Constitutional: No fevers or chills. No weight loss. No fatigue or generalized malaise.  Eyes: No itching or discharge from the eyes  ENT: No ear pain. No ear discharge. No nasal congestion. No post nasal drip. No epistaxis. No throat pain. No sore throat. No difficulty swallowing.   CV: No chest pain. No palpitations. No lightheadedness or dizziness.   Resp: No dyspnea at rest. +dyspnea on exertion. No orthopnea. No wheezing. No cough. No stridor. No sputum production. No chest pain with respiration.  GI: No nausea. No vomiting. No diarrhea.  MSK: No joint pain or pain in any extremities  Integumentary: No skin lesions. No pedal edema.  Neurological: No gross motor weakness. No sensory changes.      OBJECTIVE:  ICU Vital Signs Last 24 Hrs  T(C): 35.8 (19 Oct 2018 14:00), Max: 36.3 (19 Oct 2018 05:17)  T(F): 96.4 (19 Oct 2018 14:00), Max: 97.3 (19 Oct 2018 05:17)  HR: 88 (19 Oct 2018 14:00) (76 - 88)  BP: 131/59 (19 Oct 2018 14:00) (118/67 - 144/71)    RR: 16 (19 Oct 2018 14:00) (16 - 18)  SpO2: 92% (19 Oct 2018 12:30) (88% - 94%)        CAPILLARY BLOOD GLUCOSE          PHYSICAL EXAM:  General: Awake, alert, oriented X 3.   HEENT: Atraumatic, normocephalic.                 Mallampatti Grade                 No nasal congestion.                No tonsillar or pharyngeal exudates.  Lymph Nodes: No palpable lymphadenopathy neck, supraclavicular region  Neck: No JVD. No carotid bruit, no thyromegally  Respiratory: Normal chest expansion                         Normal percussion                         decreased  air entry                         mild wheeze,  no rhonchi or rales.  Cardiovascular: S1 S2 normal. No murmurs, rubs or gallops.   Abdomen: Soft, non-tender, non-distended. No organomegaly.  Extremities: Warm to touch. Peripheral pulse palpable. No pedal edema.   Skin: No rashes or skin lesions, warm dry  Neurological: Motor and sensory examination equal and normal in all four extremities.  Psychiatry: Appropriate mood and affect.    HOSPITAL MEDICATIONS:  MEDICATIONS  (STANDING):  ALBUTerol/ipratropium for Nebulization 3 milliLiter(s) Nebulizer every 6 hours  aspirin  chewable 81 milliGRAM(s) Oral daily  diltiazem    milliGRAM(s) Oral daily  heparin  Injectable 5000 Unit(s) SubCutaneous every 12 hours  levoFLOXacin IVPB 750 milliGRAM(s) IV Intermittent every 24 hours  metoprolol succinate ER 50 milliGRAM(s) Oral daily  simvastatin 10 milliGRAM(s) Oral at bedtime  sodium chloride 0.9%. 1000 milliLiter(s) (1000 mL/Hr) IV Continuous <Continuous>  sodium chloride 0.9%. 1000 milliLiter(s) (1000 mL/Hr) IV Continuous <Continuous>    MEDICATIONS  (PRN):  acetaminophen   Tablet .. 650 milliGRAM(s) Oral every 6 hours PRN Mild Pain (1 - 3)  acetaminophen   Tablet .. 650 milliGRAM(s) Oral every 6 hours PRN Mild Pain (1 - 3), Moderate Pain (4 - 6)      LABS:                        14.2   19.47 )-----------( 154      ( 19 Oct 2018 06:49 )             43.4     10-19    143  |  106  |  26<H>  ----------------------------<  135<H>  4.4   |  23  |  0.9    Ca    8.6      19 Oct 2018 06:49    TPro  6.2  /  Alb  3.5  /  TBili  0.5  /  DBili  x   /  AST  22  /  ALT  27  /  AlkPhos  72  10-19                      RADIOLOGY:Radiology personally reviewed.
Pt seen and examined at bedside. Denies any complaints. Pain is well controlled.     VITAL SIGNS (Last 24 hrs):  T(C): 36.7 (10-18-18 @ 07:53), Max: 37.1 (10-17-18 @ 22:10)  HR: 96 (10-18-18 @ 07:53) (96 - 110)  BP: 117/58 (10-18-18 @ 07:53) (117/58 - 130/60)  RR: 16 (10-18-18 @ 07:53) (16 - 16)  SpO2: 95% (10-18-18 @ 07:53) (95% - 95%)  Wt(kg): --  Daily     Daily     I&O's Summary      PHYSICAL EXAM:  GENERAL: NAD   HEAD:  Atraumatic, Normocephalic  EYES: EOMI, PERRLA, conjunctiva and sclera clear  NECK: Supple, No JVD  CHEST/LUNG: Clear to auscultation bilaterally; No wheeze  HEART: Regular rate and rhythm; No murmurs, rubs, or gallops  ABDOMEN: Soft, Nontender, Nondistended; Bowel sounds present  EXTREMITIES:  2+ Peripheral Pulses, No clubbing, cyanosis, or edema  PSYCH: AAOx3  NEUROLOGY: non-focal  SKIN: No rashes or lesions    Labs Reviewed  Spoke to patient in regards to abnormal labs.    CBC Full  -  ( 18 Oct 2018 06:29 )  WBC Count : 19.03 K/uL  Hemoglobin : 13.0 g/dL  Hematocrit : 39.2 %  Platelet Count - Automated : 121 K/uL  Mean Cell Volume : 90.7 fL  Mean Cell Hemoglobin : 30.1 pg  Mean Cell Hemoglobin Concentration : 33.2 g/dL  Auto Neutrophil # : x  Auto Lymphocyte # : x  Auto Monocyte # : x  Auto Eosinophil # : x  Auto Basophil # : x  Auto Neutrophil % : x  Auto Lymphocyte % : x  Auto Monocyte % : x  Auto Eosinophil % : x  Auto Basophil % : x    BMP:    10-18 @ 06:29    Blood Urea Nitrogen - 24  Calcium - 8.5  Carbon Dioxide - 22  Chloride - 102  Creatinine - 0.9  Glucose - 128  Potassium - 4.5  Sodium - 139       MEDICATIONS  (STANDING):  ALBUTerol/ipratropium for Nebulization 3 milliLiter(s) Nebulizer every 6 hours  aspirin  chewable 81 milliGRAM(s) Oral daily  diltiazem    milliGRAM(s) Oral daily  heparin  Injectable 5000 Unit(s) SubCutaneous every 12 hours  levoFLOXacin IVPB 750 milliGRAM(s) IV Intermittent every 24 hours  methylPREDNISolone sodium succinate Injectable 40 milliGRAM(s) IV Push daily  metoprolol succinate ER 50 milliGRAM(s) Oral daily  simvastatin 10 milliGRAM(s) Oral at bedtime  sodium chloride 0.9%. 1000 milliLiter(s) (1000 mL/Hr) IV Continuous <Continuous>  sodium chloride 0.9%. 1000 milliLiter(s) (1000 mL/Hr) IV Continuous <Continuous>    MEDICATIONS  (PRN):  acetaminophen   Tablet .. 650 milliGRAM(s) Oral every 6 hours PRN Mild Pain (1 - 3)  acetaminophen   Tablet .. 650 milliGRAM(s) Oral every 6 hours PRN Mild Pain (1 - 3), Moderate Pain (4 - 6)
Pt seen and examined at bedside. Denies any complaints. Pain is well controlled. Desaturated to 88% on room air.     VITAL SIGNS (Last 24 hrs):  T(C): 36.3 (10-19-18 @ 05:17), Max: 36.3 (10-19-18 @ 05:17)  HR: 76 (10-19-18 @ 05:17) (76 - 82)  BP: 144/71 (10-19-18 @ 05:17) (118/67 - 144/71)  RR: 16 (10-19-18 @ 12:10) (16 - 18)  SpO2: 88% (10-19-18 @ 12:10) (88% - 94%)  Wt(kg): --  Daily     Daily     I&O's Summary      PHYSICAL EXAM:  GENERAL: NAD   HEAD:  Atraumatic, Normocephalic  EYES: EOMI, PERRLA, conjunctiva and sclera clear  NECK: Supple, No JVD  CHEST/LUNG: Clear to auscultation bilaterally; No wheeze  HEART: Regular rate and rhythm; No murmurs, rubs, or gallops  ABDOMEN: Soft, Nontender, Nondistended; Bowel sounds present  EXTREMITIES:  2+ Peripheral Pulses, No clubbing, cyanosis, or edema  PSYCH: AAOx3  NEUROLOGY: non-focal  SKIN: No rashes or lesions    Labs Reviewed  Spoke to patient in regards to abnormal labs.    CBC Full  -  ( 19 Oct 2018 06:49 )  WBC Count : 19.47 K/uL  Hemoglobin : 14.2 g/dL  Hematocrit : 43.4 %  Platelet Count - Automated : 154 K/uL  Mean Cell Volume : 92.1 fL  Mean Cell Hemoglobin : 30.1 pg  Mean Cell Hemoglobin Concentration : 32.7 g/dL  Auto Neutrophil # : x  Auto Lymphocyte # : x  Auto Monocyte # : x  Auto Eosinophil # : x  Auto Basophil # : x  Auto Neutrophil % : x  Auto Lymphocyte % : x  Auto Monocyte % : x  Auto Eosinophil % : x  Auto Basophil % : x    BMP:    10-19 @ 06:49    Blood Urea Nitrogen - 26  Calcium - 8.6  Carbond Dioxide - 23  Chloride - 106  Creatinine - 0.9  Glucose - 135  Potassium - 4.4  Sodium - 143         Urine Culture:  10-17 @ 01:30 Urine culture: --    Culture Results:   <10,000 CFU/ml  Normal Urogenital vivek present  Method Type: --  Organism: --  Organism Identification: --  Specimen Source: .Urine Clean Catch (Midstream)       MEDICATIONS  (STANDING):  ALBUTerol/ipratropium for Nebulization 3 milliLiter(s) Nebulizer every 6 hours  aspirin  chewable 81 milliGRAM(s) Oral daily  diltiazem    milliGRAM(s) Oral daily  heparin  Injectable 5000 Unit(s) SubCutaneous every 12 hours  levoFLOXacin IVPB 750 milliGRAM(s) IV Intermittent every 24 hours  metoprolol succinate ER 50 milliGRAM(s) Oral daily  simvastatin 10 milliGRAM(s) Oral at bedtime  sodium chloride 0.9%. 1000 milliLiter(s) (1000 mL/Hr) IV Continuous <Continuous>  sodium chloride 0.9%. 1000 milliLiter(s) (1000 mL/Hr) IV Continuous <Continuous>    MEDICATIONS  (PRN):  acetaminophen   Tablet .. 650 milliGRAM(s) Oral every 6 hours PRN Mild Pain (1 - 3)  acetaminophen   Tablet .. 650 milliGRAM(s) Oral every 6 hours PRN Mild Pain (1 - 3), Moderate Pain (4 - 6)

## 2018-10-19 NOTE — PROGRESS NOTE ADULT - ASSESSMENT
Impression:  Acute chronic COPD with exacerbation  Severe emphysema at baseline  cannot R/O an early pneumonia or bronchitis  tibial plateau fx  Left small finger fx    PLAN:  agree with SNF  , continue O2  to maintain sats > 90%  needs Prednisone taper in rehab  Cont Advair 250 BID  PRN albuterol     complete course of antibiotics    GI and DVT prophylaxis  pulmonary toilet    OOB NWB ambulate per ORtho  F/U my office after SNF

## 2018-10-20 ENCOUNTER — TRANSCRIPTION ENCOUNTER (OUTPATIENT)
Age: 78
End: 2018-10-20

## 2018-10-20 VITALS — OXYGEN SATURATION: 93 % | RESPIRATION RATE: 20 BRPM

## 2018-10-20 LAB
ANION GAP SERPL CALC-SCNC: 13 MMOL/L — SIGNIFICANT CHANGE UP (ref 7–14)
BASOPHILS # BLD AUTO: 0.02 K/UL — SIGNIFICANT CHANGE UP (ref 0–0.2)
BASOPHILS NFR BLD AUTO: 0.1 % — SIGNIFICANT CHANGE UP (ref 0–1)
BUN SERPL-MCNC: 25 MG/DL — HIGH (ref 10–20)
CALCIUM SERPL-MCNC: 8.5 MG/DL — SIGNIFICANT CHANGE UP (ref 8.5–10.1)
CHLORIDE SERPL-SCNC: 103 MMOL/L — SIGNIFICANT CHANGE UP (ref 98–110)
CO2 SERPL-SCNC: 24 MMOL/L — SIGNIFICANT CHANGE UP (ref 17–32)
CREAT SERPL-MCNC: 0.8 MG/DL — SIGNIFICANT CHANGE UP (ref 0.7–1.5)
CRP SERPL-MCNC: 0.26 MG/DL — SIGNIFICANT CHANGE UP (ref 0–0.4)
EOSINOPHIL # BLD AUTO: 0 K/UL — SIGNIFICANT CHANGE UP (ref 0–0.7)
EOSINOPHIL NFR BLD AUTO: 0 % — SIGNIFICANT CHANGE UP (ref 0–8)
ERYTHROCYTE [SEDIMENTATION RATE] IN BLOOD: 2 MM/HR — SIGNIFICANT CHANGE UP (ref 0–20)
GLUCOSE SERPL-MCNC: 99 MG/DL — SIGNIFICANT CHANGE UP (ref 70–99)
HCT VFR BLD CALC: 41.8 % — SIGNIFICANT CHANGE UP (ref 37–47)
HGB BLD-MCNC: 13.6 G/DL — SIGNIFICANT CHANGE UP (ref 12–16)
IMM GRANULOCYTES NFR BLD AUTO: 1.1 % — HIGH (ref 0.1–0.3)
LYMPHOCYTES # BLD AUTO: 0.69 K/UL — LOW (ref 1.2–3.4)
LYMPHOCYTES # BLD AUTO: 4.6 % — LOW (ref 20.5–51.1)
MCHC RBC-ENTMCNC: 29.8 PG — SIGNIFICANT CHANGE UP (ref 27–31)
MCHC RBC-ENTMCNC: 32.5 G/DL — SIGNIFICANT CHANGE UP (ref 32–37)
MCV RBC AUTO: 91.7 FL — SIGNIFICANT CHANGE UP (ref 81–99)
MONOCYTES # BLD AUTO: 1.11 K/UL — HIGH (ref 0.1–0.6)
MONOCYTES NFR BLD AUTO: 7.5 % — SIGNIFICANT CHANGE UP (ref 1.7–9.3)
NEUTROPHILS # BLD AUTO: 12.89 K/UL — HIGH (ref 1.4–6.5)
NEUTROPHILS NFR BLD AUTO: 86.7 % — HIGH (ref 42.2–75.2)
NRBC # BLD: 0 /100 WBCS — SIGNIFICANT CHANGE UP (ref 0–0)
PLATELET # BLD AUTO: 162 K/UL — SIGNIFICANT CHANGE UP (ref 130–400)
POTASSIUM SERPL-MCNC: 4.6 MMOL/L — SIGNIFICANT CHANGE UP (ref 3.5–5)
POTASSIUM SERPL-SCNC: 4.6 MMOL/L — SIGNIFICANT CHANGE UP (ref 3.5–5)
RBC # BLD: 4.56 M/UL — SIGNIFICANT CHANGE UP (ref 4.2–5.4)
RBC # FLD: 13.2 % — SIGNIFICANT CHANGE UP (ref 11.5–14.5)
SODIUM SERPL-SCNC: 140 MMOL/L — SIGNIFICANT CHANGE UP (ref 135–146)
WBC # BLD: 14.88 K/UL — HIGH (ref 4.8–10.8)
WBC # FLD AUTO: 14.88 K/UL — HIGH (ref 4.8–10.8)

## 2018-10-20 RX ORDER — CIPROFLOXACIN LACTATE 400MG/40ML
1 VIAL (ML) INTRAVENOUS
Qty: 3 | Refills: 0
Start: 2018-10-20 | End: 2018-10-22

## 2018-10-20 RX ADMIN — Medication 81 MILLIGRAM(S): at 11:34

## 2018-10-20 RX ADMIN — Medication 40 MILLIGRAM(S): at 06:25

## 2018-10-20 RX ADMIN — HEPARIN SODIUM 5000 UNIT(S): 5000 INJECTION INTRAVENOUS; SUBCUTANEOUS at 06:26

## 2018-10-20 RX ADMIN — Medication 3 MILLILITER(S): at 09:15

## 2018-10-20 RX ADMIN — Medication 50 MILLIGRAM(S): at 06:25

## 2018-10-20 NOTE — DISCHARGE NOTE ADULT - CARE PROVIDER_API CALL
Suly Torres (MD), Surgery of the Hand  [facility] [place] [address]  Phone: [phone]  Fax: [phone]

## 2018-10-20 NOTE — DISCHARGE NOTE ADULT - PATIENT PORTAL LINK FT
You can access the [facility], offered by [facility], by registering with the following website: [email or web address]

## 2018-10-20 NOTE — DISCHARGE NOTE ADULT - MEDICATION SUMMARY - MEDICATIONS TO TAKE
I will START or STAY ON the medications listed below when I get home from the hospital:    predniSONE 20 mg oral tablet  -- 2 tab(s) by mouth once a day  -- Indication: For COPD Exacerbation     aspirin 81 mg oral tablet  -- Indication: For CVA prophylaxis     Cartia  mg/24 hours oral capsule, extended release  -- Indication: For HTN    simvastatin 10 mg oral tablet  -- Indication: For HLD    Zetia 10 mg oral tablet  -- Indication: For HLD    Toprol-XL  -- 50 milligram(s) orally  -- Indication: For HTN    Advair Diskus  -- Indication: For Chronic obstructive pulmonary disease    levoFLOXacin 750 mg oral tablet  -- 1 tab(s) by mouth every 24 hours  -- Indication: For Pneumonia

## 2018-10-20 NOTE — DISCHARGE NOTE ADULT - HOSPITAL COURSE
Elderly patient with hx of HTN, Emphysema presented after a fall. Found to have right tibial plateau fracture which was evaluated by orthopedics and recommended for conservative management. She also had hypoxic respiratory failure on admission and was treated with steroids for COPD exacerbation and possible early pneumonia/bronchitis. She improved clinically and her oxygenation improved as well. She will need long term O2 for advanced emphysema.

## 2018-10-20 NOTE — DISCHARGE NOTE ADULT - CARE PLAN
Principal Discharge DX:	Pulmonary emphysema, unspecified emphysema type  Goal:	prevent exacerbation and complete treatment  Assessment and plan of treatment:	Patient treated for COPD exacerbation and bronchitis   hypoxia improved with steriods and antibiotics, however she will require long term oxgyen due to severe emphysema   take prednisone 40mg daily for 3 more days then stop   take Levofloxacin 750mg for 3 more days then stop  Secondary Diagnosis:	Closed displaced fracture of right tibial tuberosity, initial encounter  Assessment and plan of treatment:	currently non weight bearing in knee immobilizer, continue range of motion   needs Arnulfo brace and orthopedic follow up

## 2018-10-22 ENCOUNTER — OUTPATIENT (OUTPATIENT)
Dept: OUTPATIENT SERVICES | Facility: HOSPITAL | Age: 78
LOS: 1 days | Discharge: HOME | End: 2018-10-22

## 2018-10-22 DIAGNOSIS — R79.9 ABNORMAL FINDING OF BLOOD CHEMISTRY, UNSPECIFIED: ICD-10-CM

## 2018-10-22 LAB
CULTURE RESULTS: SIGNIFICANT CHANGE UP
CULTURE RESULTS: SIGNIFICANT CHANGE UP
SPECIMEN SOURCE: SIGNIFICANT CHANGE UP
SPECIMEN SOURCE: SIGNIFICANT CHANGE UP

## 2018-10-23 PROBLEM — I10 ESSENTIAL (PRIMARY) HYPERTENSION: Chronic | Status: ACTIVE | Noted: 2018-10-16

## 2018-10-23 PROBLEM — E78.00 PURE HYPERCHOLESTEROLEMIA, UNSPECIFIED: Chronic | Status: ACTIVE | Noted: 2018-10-16

## 2018-10-23 PROBLEM — J44.9 CHRONIC OBSTRUCTIVE PULMONARY DISEASE, UNSPECIFIED: Chronic | Status: ACTIVE | Noted: 2018-10-16

## 2018-10-26 DIAGNOSIS — E78.5 HYPERLIPIDEMIA, UNSPECIFIED: ICD-10-CM

## 2018-10-26 DIAGNOSIS — J15.6 PNEUMONIA DUE TO OTHER GRAM-NEGATIVE BACTERIA: ICD-10-CM

## 2018-10-26 DIAGNOSIS — J18.9 PNEUMONIA, UNSPECIFIED ORGANISM: ICD-10-CM
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
## 2018-10-26 DIAGNOSIS — S62.607A FRACTURE OF UNSPECIFIED PHALANX OF LEFT LITTLE FINGER, INITIAL ENCOUNTER FOR CLOSED FRACTURE: ICD-10-CM

## 2018-10-26 DIAGNOSIS — I10 ESSENTIAL (PRIMARY) HYPERTENSION: ICD-10-CM

## 2018-10-26 DIAGNOSIS — J90 PLEURAL EFFUSION, NOT ELSEWHERE CLASSIFIED: ICD-10-CM

## 2018-10-26 DIAGNOSIS — Z87.891 PERSONAL HISTORY OF NICOTINE DEPENDENCE: ICD-10-CM

## 2018-10-26 DIAGNOSIS — S82.131A DISPLACED FRACTURE OF MEDIAL CONDYLE OF RIGHT TIBIA, INITIAL ENCOUNTER FOR CLOSED FRACTURE: ICD-10-CM
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
## 2018-10-26 DIAGNOSIS — S01.81XA LACERATION WITHOUT FOREIGN BODY OF OTHER PART OF HEAD, INITIAL ENCOUNTER: ICD-10-CM

## 2018-10-26 DIAGNOSIS — Z88.0 ALLERGY STATUS TO PENICILLIN: ICD-10-CM

## 2018-10-26 DIAGNOSIS — Z79.82 LONG TERM (CURRENT) USE OF ASPIRIN: ICD-10-CM

## 2018-10-26 DIAGNOSIS — E46 UNSPECIFIED PROTEIN-CALORIE MALNUTRITION: ICD-10-CM
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
## 2018-10-26 DIAGNOSIS — J43.9 EMPHYSEMA, UNSPECIFIED: ICD-10-CM

## 2018-10-26 DIAGNOSIS — A41.9 SEPSIS, UNSPECIFIED ORGANISM: ICD-10-CM

## 2019-04-05 NOTE — H&P ADULT - NSHPLANGLIMITEDENGLISH_GEN_A_CORE
No
Presents to ED for intermittent inguinal hernia pain.  Patient reports pain began two days ago and he has noticed "popping out" of the area, but states it is easily pushed back in with no difficulty.  Denies any present pain but mild tenderness upon palpation to the area during evaluation.  Denies any present CP, SOB, n/v, fevers, chills, difficulty urinating, constipation, diarrhea.

## 2019-05-06 ENCOUNTER — OUTPATIENT (OUTPATIENT)
Dept: OUTPATIENT SERVICES | Facility: HOSPITAL | Age: 79
LOS: 1 days | Discharge: HOME | End: 2019-05-06
Payer: MEDICARE

## 2019-05-06 DIAGNOSIS — Z12.31 ENCOUNTER FOR SCREENING MAMMOGRAM FOR MALIGNANT NEOPLASM OF BREAST: ICD-10-CM

## 2019-05-06 PROCEDURE — 77063 BREAST TOMOSYNTHESIS BI: CPT | Mod: 26

## 2019-05-06 PROCEDURE — 77067 SCR MAMMO BI INCL CAD: CPT | Mod: 26

## 2019-12-09 NOTE — PHYSICAL THERAPY INITIAL EVALUATION ADULT - HEALTH SCREEN CRITERIA
[facility] ambulatory encounter    URGENT CARE INITIAL EVALUATION    CHIEF COMPLAINT:    Chief Complaint   Patient presents with   • Cough       SUBJECTIVE:  Zulma Mendoza is a 35 year old female, who presents with a complaint of 4 days of a dry hacking cough.  She has chest tightness and congestion.  Has shortness of breath and upper anterior chest pain.  Her throat feels dry.  Back of her neck is sore.  Has had fevers to 101.8°.  Has had nausea and some diarrhea.  No vomiting.  Appetite is decreased.  Past medical history is significant for birth control related left pulmonary embolism.  Has been taking over-the-counter cough and congestion medication and ibuprofen for her symptoms.    REVIEW OF SYSTEMS:  A review of systems was performed and findings relevant to this complaint are included in the HPI.    HISTORIES:  ALLERGIES:  No Known Allergies    MEDICATIONS:  Current Outpatient Medications   Medication Sig   • FLUoxetine (PROZAC) 20 MG capsule TAKE 2 CAPSULES BY MOUTH DAILY   • clonazePAM (KLONOPIN) 0.5 MG tablet TAKE 1 TABLET BY MOUTH THREE TIMES DAILY AS NEEDED. MUST LAST 30 DAYS   • gabapentin (NEURONTIN) 300 MG capsule Take 1 capsule by mouth 3 times daily.   • Cholecalciferol (VITAMIN D3) 5000 UNITS CAPS Take 1 capsule by mouth daily.   • benzonatate (TESSALON PERLES) 200 MG capsule Take 1 capsule by mouth 3 times daily as needed for Cough.     No current facility-administered medications for this visit.        Past Medical History:   Diagnosis Date   • Alcohol consumption binge drinking 8/31/2015   • Anxiety    • Depressive disorder    • Pulmonary embolism and infarction (CMS/HCC) 9/22/2018   • Self-inflicted injury 8/31/2015     Past Surgical History:   Procedure Laterality Date   • Colonoscopy w/ polypectomy  08/23/2019    tubular adenoma rpt age 49   • Egd  08/23/2019    rpt prn   • Removal of tonsils,<12 y/o      7 yrs old   • Tendon release  years ago    Right foot     Social History     Tobacco Use 
  • Smoking status: Former Smoker     Packs/day: 0.50     Years: 13.00     Pack years: 6.50     Types: Cigarettes     Last attempt to quit: 10/17/2019     Years since quittin.1   • Smokeless tobacco: Never Used   Substance Use Topics   • Alcohol use: Not Currently     Frequency: 2-4 times a month     Drinks per session: 1 or 2     Binge frequency: Never   • Drug use: No     Social History     Tobacco Use   Smoking Status Former Smoker   • Packs/day: 0.50   • Years: 13.00   • Pack years: 6.50   • Types: Cigarettes   • Last attempt to quit: 10/17/2019   • Years since quittin.1   Smokeless Tobacco Never Used     Social History     Substance and Sexual Activity   Alcohol Use Not Currently   • Frequency: 2-4 times a month   • Drinks per session: 1 or 2   • Binge frequency: Never       OBJECTIVE:  PHYSICAL EXAM:  Visit Vitals  /70   Pulse 70   Temp 97.2 °F (36.2 °C) (Temporal)   Resp 16   Wt 97.7 kg   LMP 2019   SpO2 98%   BMI 35.84 kg/m²     General:  Adequately hydrated female who appears in no acute distress.  Voice is hoarse.  Eyes:  No jaundice, injection or drainage.  Pupils are round and equally reactive to light.  Ears:  Normal canals.  Normal tympanic membranes.   No external tenderness.  Oral Cavity:  No trismus.  Oral mucosa is moist.  No lesions of gums or hard palate.  Airway is patent.  Minimal pharyngeal injection without exudate or swelling.  Neck:  No lymphadenopathy.  Supple.  Lungs:  No respiratory distress. Normal breath sounds. No rales. No wheezing.  Cardiac:  Regular rate and rhythm.  No murmur.      URGENT CARE COURSE:  Remains stable department.  Vital signs are within normal limits.  Pulse oximetry of 98% on room air indicates normal oxygenation.  She was offered a chest x-ray, which she is declining at this time.  She will return if she continues to worsen.  Findings are consistent with a viral respiratory infection.  Recommending symptomatic measures as per 
no
below.    ASSESSMENT:    Viral respiratory infection    PLAN:  Orders Placed This Encounter   • benzonatate (TESSALON PERLES) 200 MG capsule       FOLLOW-UP:  To return if she continues to worsen    PATIENT INSTRUCTIONS:  Prescribed Tessalon for the cough  Drink plenty of fluids to keep secretions thin.  Take Mucinex to also help with keeping secretions thin  Take pseudoephedrine to shrink mucosal swelling.  Try a Neti pot if sinuses worsen  Sucrets for the sore throat  Honey for the cough  Tylenol or ibuprofen with food for the pains.  Follow up with your regular physician or return if you worsen.    The patient was advised to follow up with primary physician or to recheck with the urgent care clinic sooner if symptoms get worse or if new symptoms appear.    The patient indicated understanding of the diagnosis and agreed with the plan of care.    More Lawrence and Dr. Hardy are supervising my care of this patient    
yes

## 2021-06-17 PROBLEM — Z00.00 ENCOUNTER FOR PREVENTIVE HEALTH EXAMINATION: Status: ACTIVE | Noted: 2021-06-17

## 2021-06-29 ENCOUNTER — APPOINTMENT (OUTPATIENT)
Dept: PULMONOLOGY | Facility: CLINIC | Age: 81
End: 2021-06-29
Payer: MEDICARE

## 2021-06-29 VITALS
WEIGHT: 117 LBS | RESPIRATION RATE: 14 BRPM | HEIGHT: 64 IN | HEART RATE: 78 BPM | OXYGEN SATURATION: 93 % | BODY MASS INDEX: 19.97 KG/M2 | SYSTOLIC BLOOD PRESSURE: 166 MMHG | DIASTOLIC BLOOD PRESSURE: 78 MMHG

## 2021-06-29 PROCEDURE — 99213 OFFICE O/P EST LOW 20 MIN: CPT

## 2021-06-29 NOTE — ASSESSMENT
[FreeTextEntry1] : A:\par COPD  \par lung nodule\par \par Stress compliance with inhalers\par PRN albuterol\par ICS/LABA\par add LAMA\par needs PFT\par weight loss\par repeat CT chest now\par F/U 6 months\par

## 2021-06-29 NOTE — HISTORY OF PRESENT ILLNESS
[Feelings Of Weakness On Exertion] : worsened exercise intolerance [Difficulty Breathing During Exertion] : worsened dyspnea on exertion [Cough] : denies coughing [Coughing Up Sputum] : denies coughing up sputum [Wheezing] : denies wheezing [Regional Soft Tissue Swelling Both Lower Extremities] : denies lower extremity edema [FreeTextEntry9] : feeling the humidity more SOB [de-identified] : weather [TextBox_4] : I reviewed my original evaluation  and last notes.\par I reviewed and interpreted the OP CT \par

## 2021-06-29 NOTE — PHYSICAL EXAM
[No Acute Distress] : no acute distress [Normal Oropharynx] : normal oropharynx [Normal Appearance] : normal appearance [No Neck Mass] : no neck mass [Normal Rate/Rhythm] : normal rate/rhythm [Normal S1, S2] : normal s1, s2 [No Murmurs] : no murmurs [No Resp Distress] : no resp distress [Clear to Auscultation Bilaterally] : clear to auscultation bilaterally [No Abnormalities] : no abnormalities [Benign] : benign [Normal Gait] : normal gait [No Clubbing] : no clubbing [No Cyanosis] : no cyanosis [No Edema] : no edema [FROM] : FROM [Normal Color/ Pigmentation] : normal color/ pigmentation [No Focal Deficits] : no focal deficits [Oriented x3] : oriented x3 [Normal Affect] : normal affect [TextBox_68] : decreased

## 2021-08-27 ENCOUNTER — NON-APPOINTMENT (OUTPATIENT)
Age: 81
End: 2021-08-27

## 2021-12-14 ENCOUNTER — APPOINTMENT (OUTPATIENT)
Age: 81
End: 2021-12-14
Payer: MEDICARE

## 2021-12-14 VITALS
OXYGEN SATURATION: 91 % | DIASTOLIC BLOOD PRESSURE: 92 MMHG | RESPIRATION RATE: 14 BRPM | WEIGHT: 121 LBS | BODY MASS INDEX: 20.66 KG/M2 | HEART RATE: 86 BPM | HEIGHT: 64 IN | SYSTOLIC BLOOD PRESSURE: 158 MMHG

## 2021-12-14 PROCEDURE — 99214 OFFICE O/P EST MOD 30 MIN: CPT

## 2021-12-14 NOTE — HISTORY OF PRESENT ILLNESS
[TextBox_4] : I reviewed and interpreted any  OP CXR/CT available \par \par I discussed the results today with the patient.\par I reviewed my original evaluation  and last notes.\par 
Yes

## 2021-12-14 NOTE — ASSESSMENT
[FreeTextEntry1] : Assessment:\par COPD  \par lung nodule\par \par plan:\par Stress compliance with inhalers. Renewed today.\par cont PRN albuterol\par cont ICS/LABA\par needs PFT\par \par CT chest now\par \par F/U after testing\par

## 2022-03-22 ENCOUNTER — RX RENEWAL (OUTPATIENT)
Age: 82
End: 2022-03-22

## 2022-04-27 ENCOUNTER — RX RENEWAL (OUTPATIENT)
Age: 82
End: 2022-04-27

## 2022-05-13 ENCOUNTER — NON-APPOINTMENT (OUTPATIENT)
Age: 82
End: 2022-05-13

## 2022-06-01 ENCOUNTER — NON-APPOINTMENT (OUTPATIENT)
Age: 82
End: 2022-06-01

## 2022-06-14 ENCOUNTER — APPOINTMENT (OUTPATIENT)
Age: 82
End: 2022-06-14
Payer: MEDICARE

## 2022-06-14 VITALS
HEART RATE: 80 BPM | OXYGEN SATURATION: 90 % | SYSTOLIC BLOOD PRESSURE: 130 MMHG | HEIGHT: 64 IN | WEIGHT: 120 LBS | DIASTOLIC BLOOD PRESSURE: 70 MMHG | BODY MASS INDEX: 20.49 KG/M2

## 2022-06-14 PROCEDURE — 99214 OFFICE O/P EST MOD 30 MIN: CPT

## 2022-06-14 NOTE — HISTORY OF PRESENT ILLNESS
[TextBox_4] : I reviewed the original evaluation and last notes.\par \par I reviewed and interpreted any  OP CXR or CT available in the files\par I discussed the results today with the patient.\par

## 2022-06-14 NOTE — REASON FOR VISIT
[Follow-Up] : a follow-up visit [COPD] : COPD [Pulmonary Nodules] : pulmonary nodules [TextBox_44] : Patient states she is slowly getting more shortness of breath as she ages.  She is taking her inhalers though she does not like the LAMA due to the bad taste.  She just takes it here there.  She just came from [place] where the temperature was very high and greatly affecting her exercise ability.

## 2022-06-14 NOTE — PHYSICAL EXAM
[No Acute Distress] : no acute distress [Normal Oropharynx] : normal oropharynx [Normal Appearance] : normal appearance [No Neck Mass] : no neck mass [Normal Rate/Rhythm] : normal rate/rhythm [Normal S1, S2] : normal s1, s2 [No Murmurs] : no murmurs [No Resp Distress] : no resp distress [Clear to Auscultation Bilaterally] : clear to auscultation bilaterally [No Abnormalities] : no abnormalities [Benign] : benign [Normal Gait] : normal gait [No Clubbing] : no clubbing [No Cyanosis] : no cyanosis [No Edema] : no edema [FROM] : FROM [Normal Color/ Pigmentation] : normal color/ pigmentation [No Focal Deficits] : no focal deficits [Oriented x3] : oriented x3 [Normal Affect] : normal affect [TextBox_68] : Marked decreased breath sounds no wheeze

## 2022-06-14 NOTE — ASSESSMENT
[FreeTextEntry1] : Assessment:\par COPD  \par lung nodule\par \par plan:\par Stress compliance with inhalers. Renewed today.\par cont PRN albuterol\par cont ICS/LABA\par needs PFT\par The patient is going to call her insurance to see if there is an alternative to LAMA that we can try from a cost standpoint\par CT chest year\par \par I believe she is slowly declining in status.  Severe COPD and her advancing age.  We briefly spoke about very low dose prednisone to be used as an alternative.  We will hold off at present.  We will follow-up in several\par

## 2022-07-21 ENCOUNTER — RX RENEWAL (OUTPATIENT)
Age: 82
End: 2022-07-21

## 2022-07-21 RX ORDER — UMECLIDINIUM 62.5 UG/1
62.5 AEROSOL, POWDER ORAL DAILY
Qty: 30 | Refills: 0 | Status: ACTIVE | COMMUNITY
Start: 2022-03-22 | End: 1900-01-01

## 2022-09-09 ENCOUNTER — RX RENEWAL (OUTPATIENT)
Age: 82
End: 2022-09-09

## 2022-09-28 ENCOUNTER — RX RENEWAL (OUTPATIENT)
Age: 82
End: 2022-09-28

## 2022-09-28 RX ORDER — ALBUTEROL SULFATE 90 UG/1
108 (90 BASE) INHALANT RESPIRATORY (INHALATION)
Qty: 8.5 | Refills: 0 | Status: ACTIVE | COMMUNITY
Start: 2022-09-28 | End: 1900-01-01

## 2022-10-11 ENCOUNTER — INPATIENT (INPATIENT)
Facility: HOSPITAL | Age: 82
LOS: 3 days | Discharge: NURSING, REHAB OR LONG-TERM CARE | End: 2022-10-15
Attending: INTERNAL MEDICINE | Admitting: INTERNAL MEDICINE

## 2022-10-11 VITALS
WEIGHT: 113.98 LBS | RESPIRATION RATE: 24 BRPM | OXYGEN SATURATION: 95 % | TEMPERATURE: 99 F | SYSTOLIC BLOOD PRESSURE: 136 MMHG | DIASTOLIC BLOOD PRESSURE: 81 MMHG | HEART RATE: 101 BPM | HEIGHT: 63 IN

## 2022-10-11 LAB
ALBUMIN SERPL ELPH-MCNC: 3.5 G/DL — SIGNIFICANT CHANGE UP (ref 3.5–5.2)
ALP SERPL-CCNC: 67 U/L — SIGNIFICANT CHANGE UP (ref 30–115)
ALT FLD-CCNC: 27 U/L — SIGNIFICANT CHANGE UP (ref 0–41)
ANION GAP SERPL CALC-SCNC: 12 MMOL/L — SIGNIFICANT CHANGE UP (ref 7–14)
APPEARANCE UR: CLEAR — SIGNIFICANT CHANGE UP
APTT BLD: 27 SEC — SIGNIFICANT CHANGE UP (ref 27–39.2)
APTT BLD: 28.3 SEC — SIGNIFICANT CHANGE UP (ref 27–39.2)
AST SERPL-CCNC: 35 U/L — SIGNIFICANT CHANGE UP (ref 0–41)
BACTERIA # UR AUTO: ABNORMAL
BASE EXCESS BLDV CALC-SCNC: 1.8 MMOL/L — SIGNIFICANT CHANGE UP (ref -2–3)
BASOPHILS # BLD AUTO: 0.05 K/UL — SIGNIFICANT CHANGE UP (ref 0–0.2)
BASOPHILS NFR BLD AUTO: 0.4 % — SIGNIFICANT CHANGE UP (ref 0–1)
BILIRUB SERPL-MCNC: 1.3 MG/DL — HIGH (ref 0.2–1.2)
BILIRUB UR-MCNC: NEGATIVE — SIGNIFICANT CHANGE UP
BLD GP AB SCN SERPL QL: SIGNIFICANT CHANGE UP
BUN SERPL-MCNC: 18 MG/DL — SIGNIFICANT CHANGE UP (ref 10–20)
CA-I SERPL-SCNC: 1.1 MMOL/L — LOW (ref 1.15–1.33)
CALCIUM SERPL-MCNC: 8.9 MG/DL — SIGNIFICANT CHANGE UP (ref 8.4–10.5)
CHLORIDE SERPL-SCNC: 99 MMOL/L — SIGNIFICANT CHANGE UP (ref 98–110)
CK SERPL-CCNC: 120 U/L — SIGNIFICANT CHANGE UP (ref 0–225)
CO2 SERPL-SCNC: 23 MMOL/L — SIGNIFICANT CHANGE UP (ref 17–32)
COLOR SPEC: YELLOW — SIGNIFICANT CHANGE UP
CREAT SERPL-MCNC: 0.8 MG/DL — SIGNIFICANT CHANGE UP (ref 0.7–1.5)
DIFF PNL FLD: ABNORMAL
EGFR: 73 ML/MIN/1.73M2 — SIGNIFICANT CHANGE UP
EOSINOPHIL # BLD AUTO: 0.04 K/UL — SIGNIFICANT CHANGE UP (ref 0–0.7)
EOSINOPHIL NFR BLD AUTO: 0.3 % — SIGNIFICANT CHANGE UP (ref 0–8)
EPI CELLS # UR: ABNORMAL /HPF
GAS PNL BLDV: 129 MMOL/L — LOW (ref 136–145)
GAS PNL BLDV: SIGNIFICANT CHANGE UP
GLUCOSE SERPL-MCNC: 106 MG/DL — HIGH (ref 70–99)
GLUCOSE UR QL: NEGATIVE MG/DL — SIGNIFICANT CHANGE UP
HCO3 BLDV-SCNC: 27 MMOL/L — SIGNIFICANT CHANGE UP (ref 22–29)
HCT VFR BLD CALC: 41.5 % — SIGNIFICANT CHANGE UP (ref 37–47)
HCT VFR BLDA CALC: 59 % — CRITICAL HIGH (ref 39–51)
HGB BLD CALC-MCNC: 19.6 G/DL — CRITICAL HIGH (ref 12.6–17.4)
HGB BLD-MCNC: 13.7 G/DL — SIGNIFICANT CHANGE UP (ref 12–16)
IMM GRANULOCYTES NFR BLD AUTO: 0.3 % — SIGNIFICANT CHANGE UP (ref 0.1–0.3)
INR BLD: 1.06 RATIO — SIGNIFICANT CHANGE UP (ref 0.65–1.3)
INR BLD: 1.08 RATIO — SIGNIFICANT CHANGE UP (ref 0.65–1.3)
KETONES UR-MCNC: NEGATIVE — SIGNIFICANT CHANGE UP
LACTATE BLDV-MCNC: 1.5 MMOL/L — SIGNIFICANT CHANGE UP (ref 0.5–2)
LACTATE SERPL-SCNC: 1.3 MMOL/L — SIGNIFICANT CHANGE UP (ref 0.7–2)
LEUKOCYTE ESTERASE UR-ACNC: ABNORMAL
LIDOCAIN IGE QN: 14 U/L — SIGNIFICANT CHANGE UP (ref 7–60)
LYMPHOCYTES # BLD AUTO: 0.7 K/UL — LOW (ref 1.2–3.4)
LYMPHOCYTES # BLD AUTO: 6.1 % — LOW (ref 20.5–51.1)
MCHC RBC-ENTMCNC: 29.5 PG — SIGNIFICANT CHANGE UP (ref 27–31)
MCHC RBC-ENTMCNC: 33 G/DL — SIGNIFICANT CHANGE UP (ref 32–37)
MCV RBC AUTO: 89.4 FL — SIGNIFICANT CHANGE UP (ref 81–99)
MONOCYTES # BLD AUTO: 0.58 K/UL — SIGNIFICANT CHANGE UP (ref 0.1–0.6)
MONOCYTES NFR BLD AUTO: 5 % — SIGNIFICANT CHANGE UP (ref 1.7–9.3)
NEUTROPHILS # BLD AUTO: 10.1 K/UL — HIGH (ref 1.4–6.5)
NEUTROPHILS NFR BLD AUTO: 87.9 % — HIGH (ref 42.2–75.2)
NITRITE UR-MCNC: NEGATIVE — SIGNIFICANT CHANGE UP
NRBC # BLD: 0 /100 WBCS — SIGNIFICANT CHANGE UP (ref 0–0)
PCO2 BLDV: 44 MMHG — HIGH (ref 39–42)
PH BLDV: 7.4 — SIGNIFICANT CHANGE UP (ref 7.32–7.43)
PH UR: 7 — SIGNIFICANT CHANGE UP (ref 5–8)
PLATELET # BLD AUTO: 125 K/UL — LOW (ref 130–400)
PO2 BLDV: 27 MMHG — SIGNIFICANT CHANGE UP
POTASSIUM BLDV-SCNC: 4.4 MMOL/L — SIGNIFICANT CHANGE UP (ref 3.5–5.1)
POTASSIUM SERPL-MCNC: 5 MMOL/L — SIGNIFICANT CHANGE UP (ref 3.5–5)
POTASSIUM SERPL-SCNC: 5 MMOL/L — SIGNIFICANT CHANGE UP (ref 3.5–5)
PROT SERPL-MCNC: 6.2 G/DL — SIGNIFICANT CHANGE UP (ref 6–8)
PROT UR-MCNC: NEGATIVE MG/DL — SIGNIFICANT CHANGE UP
PROTHROM AB SERPL-ACNC: 12.1 SEC — SIGNIFICANT CHANGE UP (ref 9.95–12.87)
PROTHROM AB SERPL-ACNC: 12.3 SEC — SIGNIFICANT CHANGE UP (ref 9.95–12.87)
RBC # BLD: 4.64 M/UL — SIGNIFICANT CHANGE UP (ref 4.2–5.4)
RBC # FLD: 12.8 % — SIGNIFICANT CHANGE UP (ref 11.5–14.5)
RBC CASTS # UR COMP ASSIST: SIGNIFICANT CHANGE UP /HPF
SAO2 % BLDV: 47.3 % — SIGNIFICANT CHANGE UP
SARS-COV-2 RNA SPEC QL NAA+PROBE: SIGNIFICANT CHANGE UP
SODIUM SERPL-SCNC: 134 MMOL/L — LOW (ref 135–146)
SP GR SPEC: 1.01 — SIGNIFICANT CHANGE UP (ref 1.01–1.03)
TROPONIN T SERPL-MCNC: <0.01 NG/ML — SIGNIFICANT CHANGE UP
UROBILINOGEN FLD QL: 0.2 MG/DL — SIGNIFICANT CHANGE UP
WBC # BLD: 11.51 K/UL — HIGH (ref 4.8–10.8)
WBC # FLD AUTO: 11.51 K/UL — HIGH (ref 4.8–10.8)
WBC UR QL: SIGNIFICANT CHANGE UP /HPF

## 2022-10-11 PROCEDURE — 71260 CT THORAX DX C+: CPT | Mod: 26,MA

## 2022-10-11 PROCEDURE — 99285 EMERGENCY DEPT VISIT HI MDM: CPT | Mod: FS

## 2022-10-11 PROCEDURE — 93010 ELECTROCARDIOGRAM REPORT: CPT

## 2022-10-11 PROCEDURE — 72125 CT NECK SPINE W/O DYE: CPT | Mod: 26,MA

## 2022-10-11 PROCEDURE — 93970 EXTREMITY STUDY: CPT | Mod: 26

## 2022-10-11 PROCEDURE — 74177 CT ABD & PELVIS W/CONTRAST: CPT | Mod: 26,MA

## 2022-10-11 PROCEDURE — 99222 1ST HOSP IP/OBS MODERATE 55: CPT | Mod: AI

## 2022-10-11 PROCEDURE — 71045 X-RAY EXAM CHEST 1 VIEW: CPT | Mod: 26

## 2022-10-11 PROCEDURE — 73502 X-RAY EXAM HIP UNI 2-3 VIEWS: CPT | Mod: 26,LT

## 2022-10-11 PROCEDURE — 70450 CT HEAD/BRAIN W/O DYE: CPT | Mod: 26,MA

## 2022-10-11 PROCEDURE — 73552 X-RAY EXAM OF FEMUR 2/>: CPT | Mod: 26,LT

## 2022-10-11 RX ORDER — SODIUM CHLORIDE 9 MG/ML
250 INJECTION INTRAMUSCULAR; INTRAVENOUS; SUBCUTANEOUS ONCE
Refills: 0 | Status: COMPLETED | OUTPATIENT
Start: 2022-10-11 | End: 2022-10-11

## 2022-10-11 RX ORDER — HEPARIN SODIUM 5000 [USP'U]/ML
5000 INJECTION INTRAVENOUS; SUBCUTANEOUS EVERY 8 HOURS
Refills: 0 | Status: DISCONTINUED | OUTPATIENT
Start: 2022-10-11 | End: 2022-10-13

## 2022-10-11 RX ORDER — METOPROLOL TARTRATE 50 MG
50 TABLET ORAL DAILY
Refills: 0 | Status: DISCONTINUED | OUTPATIENT
Start: 2022-10-12 | End: 2022-10-12

## 2022-10-11 RX ORDER — EZETIMIBE 10 MG/1
0 TABLET ORAL
Qty: 0 | Refills: 0 | DISCHARGE

## 2022-10-11 RX ORDER — DILTIAZEM HCL 120 MG
300 CAPSULE, EXT RELEASE 24 HR ORAL DAILY
Refills: 0 | Status: DISCONTINUED | OUTPATIENT
Start: 2022-10-12 | End: 2022-10-15

## 2022-10-11 RX ORDER — ACETAMINOPHEN 500 MG
650 TABLET ORAL EVERY 6 HOURS
Refills: 0 | Status: DISCONTINUED | OUTPATIENT
Start: 2022-10-11 | End: 2022-10-15

## 2022-10-11 RX ORDER — ALBUTEROL 90 UG/1
2 AEROSOL, METERED ORAL
Qty: 0 | Refills: 0 | DISCHARGE

## 2022-10-11 RX ORDER — DILTIAZEM HCL 120 MG
300 CAPSULE, EXT RELEASE 24 HR ORAL ONCE
Refills: 0 | Status: COMPLETED | OUTPATIENT
Start: 2022-10-11 | End: 2022-10-11

## 2022-10-11 RX ORDER — ONDANSETRON 8 MG/1
4 TABLET, FILM COATED ORAL EVERY 8 HOURS
Refills: 0 | Status: DISCONTINUED | OUTPATIENT
Start: 2022-10-11 | End: 2022-10-15

## 2022-10-11 RX ORDER — FLUTICASONE PROPIONATE AND SALMETEROL 50; 250 UG/1; UG/1
0 POWDER ORAL; RESPIRATORY (INHALATION)
Qty: 0 | Refills: 0 | DISCHARGE

## 2022-10-11 RX ORDER — ASPIRIN/CALCIUM CARB/MAGNESIUM 324 MG
81 TABLET ORAL DAILY
Refills: 0 | Status: DISCONTINUED | OUTPATIENT
Start: 2022-10-11 | End: 2022-10-15

## 2022-10-11 RX ORDER — ALBUTEROL 90 UG/1
2 AEROSOL, METERED ORAL EVERY 6 HOURS
Refills: 0 | Status: DISCONTINUED | OUTPATIENT
Start: 2022-10-11 | End: 2022-10-15

## 2022-10-11 RX ORDER — UMECLIDINIUM 62.5 UG/1
1 AEROSOL, POWDER ORAL
Qty: 0 | Refills: 0 | DISCHARGE

## 2022-10-11 RX ORDER — LANOLIN ALCOHOL/MO/W.PET/CERES
3 CREAM (GRAM) TOPICAL AT BEDTIME
Refills: 0 | Status: DISCONTINUED | OUTPATIENT
Start: 2022-10-11 | End: 2022-10-15

## 2022-10-11 RX ORDER — CHLORHEXIDINE GLUCONATE 213 G/1000ML
1 SOLUTION TOPICAL
Refills: 0 | Status: DISCONTINUED | OUTPATIENT
Start: 2022-10-11 | End: 2022-10-15

## 2022-10-11 RX ORDER — TIOTROPIUM BROMIDE 18 UG/1
1 CAPSULE ORAL; RESPIRATORY (INHALATION) DAILY
Refills: 0 | Status: DISCONTINUED | OUTPATIENT
Start: 2022-10-11 | End: 2022-10-15

## 2022-10-11 RX ORDER — BUDESONIDE AND FORMOTEROL FUMARATE DIHYDRATE 160; 4.5 UG/1; UG/1
2 AEROSOL RESPIRATORY (INHALATION)
Refills: 0 | Status: DISCONTINUED | OUTPATIENT
Start: 2022-10-11 | End: 2022-10-15

## 2022-10-11 RX ORDER — METOPROLOL TARTRATE 50 MG
50 TABLET ORAL ONCE
Refills: 0 | Status: COMPLETED | OUTPATIENT
Start: 2022-10-11 | End: 2022-10-11

## 2022-10-11 RX ORDER — SIMVASTATIN 20 MG/1
10 TABLET, FILM COATED ORAL AT BEDTIME
Refills: 0 | Status: DISCONTINUED | OUTPATIENT
Start: 2022-10-11 | End: 2022-10-15

## 2022-10-11 RX ADMIN — Medication 300 MILLIGRAM(S): at 16:22

## 2022-10-11 RX ADMIN — HEPARIN SODIUM 5000 UNIT(S): 5000 INJECTION INTRAVENOUS; SUBCUTANEOUS at 22:07

## 2022-10-11 RX ADMIN — SIMVASTATIN 10 MILLIGRAM(S): 20 TABLET, FILM COATED ORAL at 22:07

## 2022-10-11 RX ADMIN — Medication 50 MILLIGRAM(S): at 16:22

## 2022-10-11 RX ADMIN — SODIUM CHLORIDE 250 MILLILITER(S): 9 INJECTION INTRAMUSCULAR; INTRAVENOUS; SUBCUTANEOUS at 14:41

## 2022-10-11 RX ADMIN — BUDESONIDE AND FORMOTEROL FUMARATE DIHYDRATE 2 PUFF(S): 160; 4.5 AEROSOL RESPIRATORY (INHALATION) at 22:07

## 2022-10-11 NOTE — H&P ADULT - NS ATTEND AMEND GEN_ALL_CORE FT
Patient is an 82 year old F w/ PMHx of COPD not on oxygen, HTN, HLD presents to the ER after a fall. Patient was walking in her kitchen at 12pm yesterday when she lost her balance and fell. She fell back and hit her head lightly on the ceramic floor. She was not on the ground for long and did not experience any loss of conscious before or after the fall, palpitations, lightheadedness, nausea, vomiting, vision changes. She wears glasses but does not remember if she wore them during the fall. She admits to just loosing her balance. She predominantly fell on her left side and did not break the fall with her hand. After the fall, she was able to walk but with pain on her left arm and hip. She did however, experience some shortness of breath thereafter. When she called EMS, she was found to be ~80% on room air and placed on oxygen and came here today.    PMHx: COPD not on oxygen, HTN, HLD  Social Hx: lives at home independently, does not use walker, no etoh, IVDA or tobacco abuse (she is ex-smoker s/p 30 years)    VITALS:   T(C): 37.3 (10-11-22 @ 13:46), Max: 37.3 (10-11-22 @ 13:46)  HR: 82 (10-11-22 @ 17:40) (82 - 101)  BP: 145/85 (10-11-22 @ 17:40) (132/74 - 145/85)  RR: 20 (10-11-22 @ 17:40) (20 - 24)  SpO2: 95% (10-11-22 @ 17:40) (95% - 96%)    GENERAL: NAD, lying in bed comfortably  HEAD:  Atraumatic, normocephalic  EYES: EOMI, PERRLA, conjunctiva and sclera clear  ENT: Moist mucous membranes  NECK: Supple, no JVD  HEART: Regular rate and rhythm, no murmurs, rubs, or gallops  LUNGS: Unlabored respirations.  Clear to auscultation bilaterally, no crackles, wheezing, or rhonchi  ABDOMEN: Soft, nontender, nondistended, +BS  EXTREMITIES: L hip tenderness, ROM limited d/t pain  NERVOUS SYSTEM:  A&Ox3, no focal deficits   SKIN: No rashes or lesions    Assessment and plan:  82 year old F w/ PMHx of COPD not on oxygen, HTN, HLD presents to the ER after a fall.    L greater trochanteric Fx  Hip Xray: Left femoral greater trochanteric fracture  CTH & c-spine negative  Pain management  Ortho consult - transfer to [place]  Preop risk evaluation: RCRI 1 (class I risk 3.9%) METS >=4. Anguiano perioperative risk 0.0 BONITA risk. Patient denies any chest pain palpitations but does have some shortness of breath with an unclear etiology with PE ruled out. No cardiac history. CT findings consistent with severe panlobular emphysema. Patient is moderate risk for low-moderate procedure.    Shortness of breath  CTA negative for fat/pulmonary embolism but positive for diffuse, severe, panlobular emphysema is noted bilaterally, with flattening of the diaphragms  May be slow developing pneumothorax? though doubtful  Continue supplemental oxygen at this time for now    COPD not on oxygen  ex-smoker, quit 30 years ago  CT chest - Diffuse, severe, panlobular emphysema is noted bilaterally, with flattening of the diaphragms  Duonebs & steroids for now    HTN  Continue metoprolol Patient is an 82 year old F w/ PMHx of COPD not on oxygen, HTN, HLD presents to the ER after a fall. Patient was walking in her kitchen at 12pm yesterday when she lost her balance and fell. She fell back and hit her head lightly on the ceramic floor. She was not on the ground for long and did not experience any loss of conscious before or after the fall, palpitations, lightheadedness, nausea, vomiting, vision changes. She wears glasses but does not remember if she wore them during the fall. She admits to just loosing her balance. She predominantly fell on her left side and did not break the fall with her hand. After the fall, she was able to walk but with pain on her left arm and hip. She did however, experience some shortness of breath thereafter. When she called EMS, she was found to be ~80% on room air and placed on oxygen and came here today.    PMHx: COPD not on oxygen, HTN, HLD  Social Hx: lives at home independently, does not use walker, no etoh, IVDA or tobacco abuse (she is ex-smoker s/p 30 years)    VITALS:   T(C): 37.3 (10-11-22 @ 13:46), Max: 37.3 (10-11-22 @ 13:46)  HR: 82 (10-11-22 @ 17:40) (82 - 101)  BP: 145/85 (10-11-22 @ 17:40) (132/74 - 145/85)  RR: 20 (10-11-22 @ 17:40) (20 - 24)  SpO2: 95% (10-11-22 @ 17:40) (95% - 96%)    GENERAL: NAD, lying in bed comfortably  HEAD:  Atraumatic, normocephalic  EYES: EOMI, PERRLA, conjunctiva and sclera clear  ENT: Moist mucous membranes  NECK: Supple, no JVD  HEART: Regular rate and rhythm, no murmurs, rubs, or gallops  LUNGS: Unlabored respirations.  Clear to auscultation bilaterally, no crackles, wheezing, or rhonchi  ABDOMEN: Soft, nontender, nondistended, +BS  EXTREMITIES: L hip tenderness, ROM limited d/t pain  NERVOUS SYSTEM:  A&Ox3, no focal deficits   SKIN: No rashes or lesions    Assessment and plan:  82 year old F w/ PMHx of COPD not on oxygen, HTN, HLD presents to the ER after a fall.    L greater trochanteric Fx  Hip Xray: Left femoral greater trochanteric fracture  CTH & c-spine negative  Pain management  Ortho consult - transfer to [place]  Preop risk evaluation: RCRI 1 (class I risk 3.9%) METS >=4. Anguiano perioperative risk 0.0 BONITA risk. Patient denies any chest pain palpitations but does have some shortness of breath with an unclear etiology with PE ruled out. No cardiac history. CT findings consistent with severe panlobular emphysema. Patient is moderate risk for low-moderate procedure.    Hypoxia  CTA negative for fat/pulmonary embolism but positive for diffuse, severe, panlobular emphysema is noted bilaterally, with flattening of the diaphragms  May be slow developing pneumothorax? though doubtful  Continue supplemental oxygen at this time for now    COPD not on oxygen  ex-smoker, quit 30 years ago  CT chest - Diffuse, severe, panlobular emphysema is noted bilaterally, with flattening of the diaphragms  c/w supplemental oxygen and albuterol PRN  repeat Chest X-Ray to monitor symptoms in am  Budesonide ordered ( equivalent version of Advair since Advair non-formulary)    HTN  Continue metoprolol, and diltiazem  - confirmed that patient takes both of these for many years    HLD  - c/w home Simvastatin  - Zetia non-formulary ( pt's family will need to bring rx from home)    Thrombocytopenia  - trend platelets     DVT ppx: heparin   Pt and A&P d/w Dr. Vickers Patient is an 82 year old F w/ PMHx of COPD not on oxygen, HTN, HLD presents to the ER after a fall. Patient was walking in her kitchen at 12pm yesterday when she lost her balance and fell. She fell back and hit her head lightly on the ceramic floor. She was not on the ground for long and did not experience any loss of conscious before or after the fall, palpitations, lightheadedness, nausea, vomiting, vision changes. She wears glasses but does not remember if she wore them during the fall. She admits to just loosing her balance. She predominantly fell on her left side and did not break the fall with her hand. After the fall, she was able to walk but with pain on her left arm and hip. She did however, experience some shortness of breath thereafter. When she called EMS, she was found to be ~80% on room air and placed on oxygen and came here today.    PMHx: COPD not on oxygen, HTN, HLD  Social Hx: lives at home independently, does not use walker, no etoh, IVDA or tobacco abuse (she is ex-smoker s/p 30 years)    VITALS:   T(C): 37.3 (10-11-22 @ 13:46), Max: 37.3 (10-11-22 @ 13:46)  HR: 82 (10-11-22 @ 17:40) (82 - 101)  BP: 145/85 (10-11-22 @ 17:40) (132/74 - 145/85)  RR: 20 (10-11-22 @ 17:40) (20 - 24)  SpO2: 95% (10-11-22 @ 17:40) (95% - 96%)    GENERAL: NAD, lying in bed comfortably  HEAD:  Atraumatic, normocephalic  EYES: EOMI, PERRLA, conjunctiva and sclera clear  ENT: Moist mucous membranes  NECK: Supple, no JVD  HEART: Regular rate and rhythm, no murmurs, rubs, or gallops  LUNGS: Unlabored respirations.  Clear to auscultation bilaterally, no crackles, wheezing, or rhonchi  ABDOMEN: Soft, nontender, nondistended, +BS  EXTREMITIES: L hip tenderness, ROM limited d/t pain  NERVOUS SYSTEM:  A&Ox3, no focal deficits   SKIN: No rashes or lesions    Assessment and plan:  82 year old F w/ PMHx of COPD not on oxygen, HTN, HLD presents to the ER after a fall.    L greater trochanteric Fx  Hip Xray: Left femoral greater trochanteric fracture  CTH & c-spine negative  Pain management  Ortho consult - transfer to [place]  Preop risk evaluation: RCRI 1 (class I risk 3.9%) METS ~4. Anguiano perioperative risk 0.0 BONITA risk. Patient denies any chest pain palpitations but does have some shortness of breath with an unclear etiology with PE ruled out. No cardiac history. CT findings consistent with severe panlobular emphysema. Patient is moderate risk for low-moderate procedure.    Hypoxia  CTA negative for fat/pulmonary embolism but positive for diffuse, severe, panlobular emphysema is noted bilaterally, with flattening of the diaphragms  May be slow developing pneumothorax? though doubtful  Continue supplemental oxygen at this time for now    COPD not on oxygen  ex-smoker, quit 30 years ago  CT chest - Diffuse, severe, panlobular emphysema is noted bilaterally, with flattening of the diaphragms  c/w supplemental oxygen and albuterol PRN  repeat Chest X-Ray to monitor symptoms in am  Budesonide ordered ( equivalent version of Advair since Advair non-formulary)    HTN  Continue metoprolol, and diltiazem  - confirmed that patient takes both of these for many years    HLD  - c/w home Simvastatin  - Zetia non-formulary ( pt's family will need to bring rx from home)    Thrombocytopenia  - trend platelets     DVT ppx: heparin   Pt and A&P d/w Dr. Vickers Patient is an 82 year old F w/ PMHx of COPD not on oxygen, HTN, HLD presents to the ER after a fall. Patient was walking in her kitchen at 12pm yesterday when she lost her balance and fell. She fell back and hit her head lightly on the ceramic floor. She was not on the ground for long and did not experience any loss of conscious before or after the fall, palpitations, lightheadedness, nausea, vomiting, vision changes. She wears glasses but does not remember if she wore them during the fall. She admits to just loosing her balance. She predominantly fell on her left side and did not break the fall with her hand. After the fall, she was able to walk but with pain on her left arm and hip. She did however, experience some shortness of breath thereafter. When she called EMS, she was found to be ~80% on room air and placed on oxygen and came here today.    PMHx: COPD not on oxygen, HTN, HLD  Medications: advair (500/50) incruse ellipta 62.5mcg, albuteral sulfate HFA 90 mcg, simvastatin 10mg, ezetimibe 10mg, diltiazem CD 300mg, metoprolol ER succinate 50mg, ASA, oxybutynin ER 10mg, alfuzosin ER 10mg  Social Hx: lives at home independently, does not use walker, no etoh, IVDA or tobacco abuse (she is ex-smoker s/p 30 years)    VITALS:   T(C): 37.3 (10-11-22 @ 13:46), Max: 37.3 (10-11-22 @ 13:46)  HR: 82 (10-11-22 @ 17:40) (82 - 101)  BP: 145/85 (10-11-22 @ 17:40) (132/74 - 145/85)  RR: 20 (10-11-22 @ 17:40) (20 - 24)  SpO2: 95% (10-11-22 @ 17:40) (95% - 96%)    GENERAL: NAD, lying in bed comfortably  HEAD:  Atraumatic, normocephalic  EYES: EOMI, PERRLA, conjunctiva and sclera clear  ENT: Moist mucous membranes  NECK: Supple, no JVD  HEART: Regular rate and rhythm, no murmurs, rubs, or gallops  LUNGS: Unlabored respirations.  Clear to auscultation bilaterally, no crackles, wheezing, or rhonchi  ABDOMEN: Soft, nontender, nondistended, +BS  EXTREMITIES: L hip tenderness, ROM limited d/t pain  NERVOUS SYSTEM:  A&Ox3, no focal deficits   SKIN: No rashes or lesions    Assessment and plan:  82 year old F w/ PMHx of COPD not on oxygen, HTN, HLD presents to the ER after a fall.    L greater trochanteric Fx  Hip Xray: Left femoral greater trochanteric fracture  CTH & c-spine negative  Pain management  Ortho consult - transfer to [place]  Preop risk evaluation: RCRI 1 (class I risk 3.9%) METS ~4. Anguiano perioperative risk 0.0 BONITA risk. Patient denies any chest pain palpitations but does have some shortness of breath with an unclear etiology with PE ruled out. No cardiac history. CT findings consistent with severe panlobular emphysema. Patient is moderate risk for low-moderate procedure.    Hypoxia  CTA negative for fat/pulmonary embolism but positive for diffuse, severe, panlobular emphysema is noted bilaterally, with flattening of the diaphragms  May be slow developing pneumothorax? though doubtful  Continue supplemental oxygen at this time for now    COPD not on oxygen  ex-smoker, quit 30 years ago  CT chest - Diffuse, severe, panlobular emphysema is noted bilaterally, with flattening of the diaphragms  c/w supplemental oxygen and albuterol PRN  repeat Chest X-Ray to monitor symptoms in am  Budesonide ordered ( equivalent version of Advair since Advair non-formulary)    HTN  Continue metoprolol succinate 50mg, and diltiazem CD 300mg  -confirmed that patient takes both of these for many years    HLD  - c/w home Simvastatin  - Zetia non-formulary ( pt's family will need to bring rx from home)    Thrombocytopenia  - trend platelets     DVT ppx: heparin Patient is an 82 year old F w/ PMHx of COPD not on oxygen, HTN, HLD presents to the ER after a fall. Patient was walking in her kitchen at 12pm yesterday when she lost her balance and fell. She fell back and hit her head lightly on the ceramic floor. She was not on the ground for long and did not experience any loss of conscious before or after the fall, palpitations, lightheadedness, nausea, vomiting, vision changes. She wears glasses but does not remember if she wore them during the fall. She admits to just loosing her balance. She predominantly fell on her left side and did not break the fall with her hand. After the fall, she was able to walk but with pain on her left arm and hip. She did however, experience some shortness of breath thereafter. When she called EMS, she was found to be ~80% on room air and placed on oxygen and came here today.    PMHx: COPD not on oxygen, HTN, HLD  Medications: advair (500/50) incruse ellipta 62.5mcg, albuteral sulfate HFA 90 mcg, simvastatin 10mg, ezetimibe 10mg, diltiazem CD 300mg, metoprolol ER succinate 50mg, ASA, oxybutynin ER 10mg, alfuzosin ER 10mg  Social Hx: lives at home independently, does not use walker, no etoh, IVDA or tobacco abuse (she is ex-smoker s/p 30 years)    VITALS:   T(C): 37.3 (10-11-22 @ 13:46), Max: 37.3 (10-11-22 @ 13:46)  HR: 82 (10-11-22 @ 17:40) (82 - 101)  BP: 145/85 (10-11-22 @ 17:40) (132/74 - 145/85)  RR: 20 (10-11-22 @ 17:40) (20 - 24)  SpO2: 95% (10-11-22 @ 17:40) (95% - 96%)    GENERAL: NAD, lying in bed comfortably  HEAD:  Atraumatic, normocephalic  EYES: EOMI, PERRLA, conjunctiva and sclera clear  ENT: Moist mucous membranes  NECK: Supple, no JVD  HEART: Regular rate and rhythm, no murmurs, rubs, or gallops  LUNGS: Unlabored respirations.  Clear to auscultation bilaterally, no crackles, wheezing, or rhonchi  ABDOMEN: Soft, nontender, nondistended, +BS  EXTREMITIES: L hip tenderness, ROM limited d/t pain  NERVOUS SYSTEM:  A&Ox3, no focal deficits   SKIN: No rashes or lesions    Assessment and plan:  82 year old F w/ PMHx of COPD not on oxygen, HTN, HLD presents to the ER after a fall.    L greater trochanteric Fx  Hip Xray: Left femoral greater trochanteric fracture  CTH & c-spine negative  Pain management  Ortho consult - transfer to [place]  Preop risk evaluation: RCRI 1 (class I risk 3.9%) METS ~4. Anguiano perioperative risk 0.0 BONITA risk. Patient denies any chest pain palpitations but does have some shortness of breath with an unclear etiology with PE ruled out. No cardiac history. CT findings consistent with severe panlobular emphysema. Patient is moderate risk for low-moderate procedure.    Hypoxia  CTA negative for fat/pulmonary embolism but positive for diffuse, severe, panlobular emphysema is noted bilaterally, with flattening of the diaphragms  May be slow developing pneumothorax? though doubtful  Continue supplemental oxygen at this time for now    COPD not on oxygen  ex-smoker, quit 30 years ago  CT chest - Diffuse, severe, panlobular emphysema is noted bilaterally, with flattening of the diaphragms  c/w supplemental oxygen and albuterol PRN  repeat Chest X-Ray to monitor symptoms in am  Budesonide ordered ( equivalent version of Advair since Advair non-formulary)    HTN  Continue evening metoprolol succinate 50mg, and morning diltiazem CD 300mg  -confirmed that patient takes both of these per her med list    HLD  - c/w home Simvastatin  - Zetia non-formulary ( pt's family will need to bring rx from home)    Thrombocytopenia  - trend platelets     DVT ppx: heparin

## 2022-10-11 NOTE — ED PROVIDER NOTE - NS ED ROS FT
Constitutional: See HPI.  Eyes: No visual changes, eye pain or discharge.   ENMT: No hearing changes, pain, discharge or infections. No neck pain or stiffness. No limited ROM  Cardiac: No SOB or edema. No chest pain with exertion.  Respiratory: No cough or respiratory distress.   GI: No nausea, vomiting, diarrhea or abdominal pain.  : No dysuria, frequency or burning. No Discharge  MS: + L hip pain.  No myalgia, muscle weakness, or back pain.  Neuro: No headache or weakness  Skin: No skin rash.  Except as documented in the HPI, all other systems are negative. never used

## 2022-10-11 NOTE — ED PROVIDER NOTE - WET READ LAUNCH FT
There are 4 Wet Read(s) to document. There are 2 Wet Read(s) to document. There are no Wet Read(s) to document.

## 2022-10-11 NOTE — ED PROVIDER NOTE - OBJECTIVE STATEMENT
82 y.o female w/ hx of HTN, COPD not on home O2 presents to the ED for evaluation of fall.  Pt states she fell last night, + head injury, no LOC.  on asa daily.  Since then has been experiencing hip pain.  Called ems today and was found to be hypoxic to 83% on RA, placed on 6 L NC by ems. Pt states she has been experiencing throbbing pain, worse w/ movement, improved w/ rest, no radiation of pain.  NO further complaints.

## 2022-10-11 NOTE — CHART NOTE - NSCHARTNOTEFT_GEN_A_CORE
called by ed to evaluate imaging   greater troch fx seen   some images show possible extension to intertroch region   pt unable to ambulate and should receive a mri to rule out fracture extension  transfer north   discussed with ed staff

## 2022-10-11 NOTE — ED ADULT NURSE NOTE - HOW OFTEN DO YOU HAVE A DRINK CONTAINING ALCOHOL?
Patient is calling back stating she's having a lot of pain and would like to talk to a nurse about options how to relieve it   Never

## 2022-10-11 NOTE — ED ADULT TRIAGE NOTE - CHIEF COMPLAINT QUOTE
as per ems, pt fell last night, c/o left hip pain was hypoxic to 83% on RA on ems arrival, placed on 6 L NC by ems

## 2022-10-11 NOTE — ED PROVIDER NOTE - CLINICAL SUMMARY MEDICAL DECISION MAKING FREE TEXT BOX
82 y.o female, PMH of HTN, COPD not on home O2, BIBA for evaluation s/p fall. Pt had a mechanical fall yesterday, where she slipped and fell backwards, hit her head, denies LOC. Initially had pain over left ribs, today pain in the ribs resolved but she is c/o SOB. Pt also reports discomfort in left hip and difficulty ambulating. Pt normally walks independently but since the fall has weakness in LLE and needs to use or walker or lean on something. On exam, pt in NAD, AAOx3, head NC/AT, CN II-XII intact, PEERL, EOMi, neck (-) midline tenderness, lungs decreased BS B/L, CV S1S2 regular, abdomen soft/NT/ND/(+)BS, pelvis stable, ext (-) edema, (-) deformity, FROM of RLE, pain and weakness in LLE, pulses and sensation intact. Labs/CT/XR done and reviewed. Pt with left greater trochanter fx. Ortho requests transfer to [place] for MRI and possible OR. Will admit.

## 2022-10-11 NOTE — H&P ADULT - ASSESSMENT
83 y/o F PMHX w/ HTN, COPD ( not on home O2), former smoker, HLD,  presenting to ED for fall last night ( pt fell onto her back on ceramic tile floor in her dining room) w/ subsequent L hip pain, back pain & L rib pain 2/2 fall as well as feeling short of breath starting today. Imaging c/w L Fracture of greater trochanter 81 y/o F PMHX w/ HTN, COPD ( not on home O2), former smoker, HLD,  presenting to ED for fall last night ( pt fell onto her back on ceramic tile floor in her dining room) w/ subsequent L hip pain, back pain & L rib pain 2/2 fall as well as feeling short of breath starting today. Imaging c/w L Fracture of greater trochanter      Assessment and Plan:   # L greater trochanter fx  - ortho consult    #: Hypoxia and SOB in the setting of COPD  - c/w supplemental oxygens and albuterol PRN  - would consider repeat CXR and/or further imaging for comparison if SOB persists given  recent trauma as well as thrombocytopenia.   - c/w home inhalers PRN     #HTN  -continue home meds toprol XL     #HLD  - c/w home Simvastatin and Zetia     #Thrombocytopenia  - trend platelets       DVT ppx: heparin   Pt and A&P d/w Dr. Vickers    Transfer pt to [place] for MRI and further management 83 y/o F PMHX w/ HTN, COPD ( not on home O2), former smoker, HLD,  presenting to ED for fall last night ( pt fell onto her back on ceramic tile floor in her dining room) w/ subsequent L hip pain, back pain & L rib pain 2/2 fall as well as feeling short of breath starting today. Imaging c/w L Fracture of greater trochanter      Assessment and Plan:   # L greater trochanter fx  - ortho consult  - Pain meds prn     #: Hypoxia and SOB in the setting of COPD  - c/w supplemental oxygen and albuterol PRN  - would consider repeat CXR and/or further imaging for comparison if SOB persists given  recent trauma as well as thrombocytopenia.   - Budesonide ordered ( equivalent version of Advair since Advair non-formulary)  - consider pulm consult    #HTN  -continue home meds toprol XL     #HLD  - c/w home Simvastatin  - Zetia non-formulary ( pt's family will need to bring rx from home)    #Thrombocytopenia  - trend platelets       DVT ppx: heparin   Pt and A&P d/w Dr. Vickers    Transfer pt to [place] for MRI and further management

## 2022-10-11 NOTE — H&P ADULT - NSHPPHYSICALEXAM_GEN_ALL_CORE
T(C): 37.3 (10-11-22 @ 13:46), Max: 37.3 (10-11-22 @ 13:46)  HR: 82 (10-11-22 @ 17:40) (82 - 101)  BP: 145/85 (10-11-22 @ 17:40) (132/74 - 145/85)  RR: 20 (10-11-22 @ 17:40) (20 - 24)  SpO2: 95% (10-11-22 @ 17:40) (95% - 96%)    PHYSICAL EXAM:  GENERAL: laying in bed, appearing comfortable and in NAD, A&Ox4   HEENT: Moist mucous membranes  NECK: Supple  CHEST/LUNG: even respirations b/l; no accessory muscle use  ABDOMEN: Soft, Nontender, Nondistended; Bowel sounds present  EXTREMITIES:   No calf TTP b/l  SKIN: warm T(C): 37.3 (10-11-22 @ 13:46), Max: 37.3 (10-11-22 @ 13:46)  HR: 82 (10-11-22 @ 17:40) (82 - 101)  BP: 145/85 (10-11-22 @ 17:40) (132/74 - 145/85)  RR: 20 (10-11-22 @ 17:40) (20 - 24)  SpO2: 95% (10-11-22 @ 17:40) (95% - 96%)    PHYSICAL EXAM:  GENERAL: sitting up in bed, appearing comfortable and in NAD, A&Ox4; (+) NC   HEENT: Dry mucous membranes  NECK: Supple  CHEST/LUNG: even respirations b/l; no accessory muscle use  ABDOMEN: Soft, Nontender,   EXTREMITIES:  mild TTP of L hip c/w greater trochanter, L hip limited across all planes 2/2 pain; LLE not appearing to be shortened or rotated in appearance; no calf pain b/l  SKIN: warm

## 2022-10-11 NOTE — ED PROVIDER NOTE - CARE PLAN
none 1 Principal Discharge DX:	Fracture of greater trochanter   Principal Discharge DX:	Fracture of greater trochanter  Secondary Diagnosis:	Fall

## 2022-10-11 NOTE — ED PROVIDER NOTE - ATTENDING APP SHARED VISIT CONTRIBUTION OF CARE
82 y.o female, PMH of HTN, COPD not on home O2, BIBA for evaluation s/p fall. Pt had a mechanical fall yesterday, where she slipped and fell backwards, hit her head, denies LOC. Initially had pain over left ribs, today pain in the ribs resolved but she is c/o SOB. Pt also reports discomfort in left hip and difficulty ambulating. Pt normally walks independently but since the fall has weakness in LLE and needs to use or walker or lean on something. On exam, pt in NAD, AAOx3, head NC/AT, CN II-XII intact, PEERL, EOMi, neck (-) midline tenderness, lungs decreased BS B/L, CV S1S2 regular, abdomen soft/NT/ND/(+)BS, pelvis stable, ext (-) edema, (-) deformity, FROM of RLE, pain and weakness in LLE, pulses and sensation intact. Labs/CT/XR done and reviewed. Pt with left greater trochanter fx. Ortho requests transfer to [place] for MRI and possible OR. Will admit.

## 2022-10-11 NOTE — PATIENT PROFILE ADULT - FALL HARM RISK - HARM RISK INTERVENTIONS
Assistance with ambulation/Assistance OOB with selected safe patient handling equipment/Communicate Risk of Fall with Harm to all staff/Discuss with provider need for PT consult/Monitor gait and stability/Reinforce activity limits and safety measures with patient and family/Tailored Fall Risk Interventions/Visual Cue: Yellow wristband and red socks/Bed in lowest position, wheels locked, appropriate side rails in place/Call bell, personal items and telephone in reach/Instruct patient to call for assistance before getting out of bed or chair/Non-slip footwear when patient is out of bed/[place] to call system/Physically safe environment - no spills, clutter or unnecessary equipment/Purposeful Proactive Rounding/Room/bathroom lighting operational, light cord in reach

## 2022-10-11 NOTE — H&P ADULT - NSHPLABSRESULTS_GEN_ALL_CORE
13.7   11.51 )-----------( 125      ( 11 Oct 2022 14:45 )             41.5       134<L>  |  99  |  18  ----------------------------<  106<H>  5.0   |  23  |  0.8    Ca    8.9      11 Oct 2022 14:45    TPro  6.2  /  Alb  3.5  /  TBili  1.3<H>  /  DBili  x   /  AST  35  /  ALT  27  /  AlkPhos  67  10-11            PT/INR - ( 11 Oct 2022 14:45 )   PT: 12.30 sec;   INR: 1.08 ratio         PTT - ( 11 Oct 2022 14:45 )  PTT:27.0 sec    CARDIAC MARKERS ( 11 Oct 2022 14:45 )  x     / <0.01 ng/mL / 120 U/L / x     / x              CT chest w/ IV contrast & CTAP w/ IV contrast IMPRESSION:  Diffuse, severe, panlobular emphysema is noted bilaterally.  There is a minimally displaced acute fracture of the greater trochanter   of the left hip.  Stranding is noted in the subcutaneous tissues lateral   to the left hip.    CT HEAD:  No acute intracranial findings.  Stable moderate chronic microvascular ischemic changes.    CT CERVICAL SPINE:  No acute cervical fracture or dislocation.  Multilevel severe degenerative changes, progressed since 10/6/2018.    --- End of Report ---    INGA ESQUIVEL MD; Attending Interventional Radiologist  This document has been electronically signed. Oct 11 2022  4:40PM    DUPLEX SCAN EXT VEINS LOWER BI                          PROCEDURE DATE:  10/11/2022  ******PRELIMINARY REPORT******      Impression:  No evidence of deep venous thrombosis or superficial thrombophlebitis in   the bilateral lower extremities.      ******PRELIMINARY REPORT******          RIGOBERTO BELTRAN MD; Vascular Fellow  This document is a PRELIMINARY interpretation and is pending final   attending approval. Oct 11 2022  4:55PM 13.7   11.51 )-----------( 125      ( 11 Oct 2022 14:45 )             41.5       134<L>  |  99  |  18  ----------------------------<  106<H>  5.0   |  23  |  0.8    Ca    8.9      11 Oct 2022 14:45    TPro  6.2  /  Alb  3.5  /  TBili  1.3<H>  /  DBili  x   /  AST  35  /  ALT  27  /  AlkPhos  67  10-11          PT/INR - ( 11 Oct 2022 14:45 )   PT: 12.30 sec;   INR: 1.08 ratio         PTT - ( 11 Oct 2022 14:45 )  PTT:27.0 sec    CARDIAC MARKERS ( 11 Oct 2022 14:45 )  x     / <0.01 ng/mL / 120 U/L / x     / x              CT chest w/ IV contrast & CTAP w/ IV contrast IMPRESSION:  Diffuse, severe, panlobular emphysema is noted bilaterally.  There is a minimally displaced acute fracture of the greater trochanter   of the left hip.  Stranding is noted in the subcutaneous tissues lateral   to the left hip.    CT HEAD:  No acute intracranial findings.  Stable moderate chronic microvascular ischemic changes.    CT CERVICAL SPINE:  No acute cervical fracture or dislocation.  Multilevel severe degenerative changes, progressed since 10/6/2018.    --- End of Report ---    INGA ESQUIVEL MD; Attending Interventional Radiologist  This document has been electronically signed. Oct 11 2022  4:40PM    DUPLEX SCAN EXT VEINS LOWER BI                          PROCEDURE DATE:  10/11/2022  ******PRELIMINARY REPORT******      Impression:  No evidence of deep venous thrombosis or superficial thrombophlebitis in   the bilateral lower extremities.      ******PRELIMINARY REPORT******          RIGOBERTO BELTRAN MD; Vascular Fellow  This document is a PRELIMINARY interpretation and is pending final   attending approval. Oct 11 2022  4:55PM

## 2022-10-11 NOTE — H&P ADULT - HISTORY OF PRESENT ILLNESS
81 y/o F PMHX w/ HTN, COPD, former smoker,  HLD, ( not on home O2) presenting to ED for fall last night ( pt fell onto her back on ceramic tile floor in her dining room). Pt reports she started having L hip pain as well as back & L rib pain 2/2 fall.  Pt reports she also banged her head but denies LOC. Pt notes she started to have SOB this AM, stating she would try to ambulate, but would have to stop due to pain and feeling short of breath.  Pt ems today and was found to be hypoxic to 83% on RA, placed on 6 L NC by ems. Pt states she has been experiencing throbbing pain, worse w/ movement, improved w/ rest, no radiation of pain.      Pt denies vision changes, headaches, leg numbness/weakness, n/v, fever, chills.

## 2022-10-11 NOTE — ED PROVIDER NOTE - PHYSICAL EXAMINATION
CONST: Well appearing in NAD  HEAD: NC/A  EYES:  Sclera and conjunctiva clear.  NECK: Non-tender, no meningeal signs, supple  CARD: Normal S1 S2; Normal rate and rhythm  RESP: Equal BS B/L, No wheezes, rhonchi or rales. No distress  GI: Soft, non-tender, non-distended.  MS: + L flank ecchymosis, mild TTP L greater trochanter, pain with ROM of L hip, not shortened or rotated, Normal ROM in all extremities. No edema of lower extremities, no calf pain, pedal pulses 1+ bilaterally  SKIN: Warm, dry, no acute rashes. Good turgor  NEURO: A&Ox3, GCS 15, No focal deficits. Strength 5/5 with no sensory deficits
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
## 2022-10-11 NOTE — H&P ADULT - NSHPSOCIALHISTORY_GEN_ALL_CORE
pt reports h/o smoking ( quit 35 years ago)  pt reports occasional alcohol use, but notes no alcohol use "for years"  pt denies h/o illicit drug use

## 2022-10-12 LAB
ANION GAP SERPL CALC-SCNC: 14 MMOL/L — SIGNIFICANT CHANGE UP (ref 7–14)
ANION GAP SERPL CALC-SCNC: 9 MMOL/L — SIGNIFICANT CHANGE UP (ref 7–14)
BASOPHILS # BLD AUTO: 0.06 K/UL — SIGNIFICANT CHANGE UP (ref 0–0.2)
BASOPHILS NFR BLD AUTO: 0.6 % — SIGNIFICANT CHANGE UP (ref 0–1)
BUN SERPL-MCNC: 14 MG/DL — SIGNIFICANT CHANGE UP (ref 10–20)
BUN SERPL-MCNC: 17 MG/DL — SIGNIFICANT CHANGE UP (ref 10–20)
CALCIUM SERPL-MCNC: 8.3 MG/DL — LOW (ref 8.4–10.5)
CALCIUM SERPL-MCNC: 8.3 MG/DL — LOW (ref 8.4–10.5)
CHLORIDE SERPL-SCNC: 101 MMOL/L — SIGNIFICANT CHANGE UP (ref 98–110)
CHLORIDE SERPL-SCNC: 102 MMOL/L — SIGNIFICANT CHANGE UP (ref 98–110)
CO2 SERPL-SCNC: 22 MMOL/L — SIGNIFICANT CHANGE UP (ref 17–32)
CO2 SERPL-SCNC: 27 MMOL/L — SIGNIFICANT CHANGE UP (ref 17–32)
CREAT SERPL-MCNC: 0.6 MG/DL — LOW (ref 0.7–1.5)
CREAT SERPL-MCNC: 0.6 MG/DL — LOW (ref 0.7–1.5)
EGFR: 89 ML/MIN/1.73M2 — SIGNIFICANT CHANGE UP
EGFR: 89 ML/MIN/1.73M2 — SIGNIFICANT CHANGE UP
EOSINOPHIL # BLD AUTO: 0.26 K/UL — SIGNIFICANT CHANGE UP (ref 0–0.7)
EOSINOPHIL NFR BLD AUTO: 2.8 % — SIGNIFICANT CHANGE UP (ref 0–8)
GLUCOSE SERPL-MCNC: 74 MG/DL — SIGNIFICANT CHANGE UP (ref 70–99)
GLUCOSE SERPL-MCNC: 83 MG/DL — SIGNIFICANT CHANGE UP (ref 70–99)
HCT VFR BLD CALC: 40.2 % — SIGNIFICANT CHANGE UP (ref 37–47)
HCT VFR BLD CALC: 42.7 % — SIGNIFICANT CHANGE UP (ref 37–47)
HGB BLD-MCNC: 13 G/DL — SIGNIFICANT CHANGE UP (ref 12–16)
HGB BLD-MCNC: 13.9 G/DL — SIGNIFICANT CHANGE UP (ref 12–16)
IMM GRANULOCYTES NFR BLD AUTO: 0.4 % — HIGH (ref 0.1–0.3)
LYMPHOCYTES # BLD AUTO: 0.84 K/UL — LOW (ref 1.2–3.4)
LYMPHOCYTES # BLD AUTO: 9 % — LOW (ref 20.5–51.1)
MAGNESIUM SERPL-MCNC: 2 MG/DL — SIGNIFICANT CHANGE UP (ref 1.8–2.4)
MCHC RBC-ENTMCNC: 29.7 PG — SIGNIFICANT CHANGE UP (ref 27–31)
MCHC RBC-ENTMCNC: 29.8 PG — SIGNIFICANT CHANGE UP (ref 27–31)
MCHC RBC-ENTMCNC: 32.3 G/DL — SIGNIFICANT CHANGE UP (ref 32–37)
MCHC RBC-ENTMCNC: 32.6 G/DL — SIGNIFICANT CHANGE UP (ref 32–37)
MCV RBC AUTO: 91.4 FL — SIGNIFICANT CHANGE UP (ref 81–99)
MCV RBC AUTO: 92 FL — SIGNIFICANT CHANGE UP (ref 81–99)
MONOCYTES # BLD AUTO: 0.68 K/UL — HIGH (ref 0.1–0.6)
MONOCYTES NFR BLD AUTO: 7.2 % — SIGNIFICANT CHANGE UP (ref 1.7–9.3)
NEUTROPHILS # BLD AUTO: 7.5 K/UL — HIGH (ref 1.4–6.5)
NEUTROPHILS NFR BLD AUTO: 80 % — HIGH (ref 42.2–75.2)
NRBC # BLD: 0 /100 WBCS — SIGNIFICANT CHANGE UP (ref 0–0)
NRBC # BLD: 0 /100 WBCS — SIGNIFICANT CHANGE UP (ref 0–0)
PLATELET # BLD AUTO: 113 K/UL — LOW (ref 130–400)
PLATELET # BLD AUTO: 124 K/UL — LOW (ref 130–400)
POTASSIUM SERPL-MCNC: 3.8 MMOL/L — SIGNIFICANT CHANGE UP (ref 3.5–5)
POTASSIUM SERPL-MCNC: 3.9 MMOL/L — SIGNIFICANT CHANGE UP (ref 3.5–5)
POTASSIUM SERPL-SCNC: 3.8 MMOL/L — SIGNIFICANT CHANGE UP (ref 3.5–5)
POTASSIUM SERPL-SCNC: 3.9 MMOL/L — SIGNIFICANT CHANGE UP (ref 3.5–5)
RBC # BLD: 4.37 M/UL — SIGNIFICANT CHANGE UP (ref 4.2–5.4)
RBC # BLD: 4.67 M/UL — SIGNIFICANT CHANGE UP (ref 4.2–5.4)
RBC # FLD: 13 % — SIGNIFICANT CHANGE UP (ref 11.5–14.5)
RBC # FLD: 13.1 % — SIGNIFICANT CHANGE UP (ref 11.5–14.5)
SODIUM SERPL-SCNC: 137 MMOL/L — SIGNIFICANT CHANGE UP (ref 135–146)
SODIUM SERPL-SCNC: 138 MMOL/L — SIGNIFICANT CHANGE UP (ref 135–146)
WBC # BLD: 11.22 K/UL — HIGH (ref 4.8–10.8)
WBC # BLD: 9.38 K/UL — SIGNIFICANT CHANGE UP (ref 4.8–10.8)
WBC # FLD AUTO: 11.22 K/UL — HIGH (ref 4.8–10.8)
WBC # FLD AUTO: 9.38 K/UL — SIGNIFICANT CHANGE UP (ref 4.8–10.8)

## 2022-10-12 PROCEDURE — 73721 MRI JNT OF LWR EXTRE W/O DYE: CPT | Mod: 26,LT

## 2022-10-12 PROCEDURE — 99233 SBSQ HOSP IP/OBS HIGH 50: CPT

## 2022-10-12 RX ORDER — METOPROLOL TARTRATE 50 MG
50 TABLET ORAL DAILY
Refills: 0 | Status: DISCONTINUED | OUTPATIENT
Start: 2022-10-12 | End: 2022-10-15

## 2022-10-12 RX ADMIN — Medication 50 MILLIGRAM(S): at 22:11

## 2022-10-12 RX ADMIN — Medication 300 MILLIGRAM(S): at 05:19

## 2022-10-12 RX ADMIN — SIMVASTATIN 10 MILLIGRAM(S): 20 TABLET, FILM COATED ORAL at 22:11

## 2022-10-12 RX ADMIN — Medication 50 MILLIGRAM(S): at 05:22

## 2022-10-12 RX ADMIN — HEPARIN SODIUM 5000 UNIT(S): 5000 INJECTION INTRAVENOUS; SUBCUTANEOUS at 05:20

## 2022-10-12 RX ADMIN — HEPARIN SODIUM 5000 UNIT(S): 5000 INJECTION INTRAVENOUS; SUBCUTANEOUS at 13:18

## 2022-10-12 RX ADMIN — CHLORHEXIDINE GLUCONATE 1 APPLICATION(S): 213 SOLUTION TOPICAL at 05:20

## 2022-10-12 RX ADMIN — HEPARIN SODIUM 5000 UNIT(S): 5000 INJECTION INTRAVENOUS; SUBCUTANEOUS at 22:11

## 2022-10-12 RX ADMIN — Medication 81 MILLIGRAM(S): at 12:08

## 2022-10-12 NOTE — PROGRESS NOTE ADULT - ASSESSMENT
82 year old F w/ PMHx of COPD not on oxygen, HTN, HLD presents to the ER after a fall.    #L greater trochanteric Fx  #Hip Xray: Left femoral greater trochanteric fracture  CTH & c-spine negative  Pain management  Ortho consult - transfer to [place]  Preop risk evaluation: RCRI 1 (class I risk 3.9%) METS ~4. Anguiano perioperative risk 0.0 BONITA risk. Patient denies any chest pain palpitations but does have some shortness of breath with an unclear etiology with PE ruled out. No cardiac history. CT findings consistent with severe panlobular emphysema. Patient is moderate risk for low-moderate procedure.  - Ortho saw the pt and rec to do MRI left hip >> ordered    #Hypoxia  CTA negative for fat/pulmonary embolism but positive for diffuse, severe, panlobular emphysema is noted bilaterally, with flattening of the diaphragms  May be slow developing pneumothorax? though doubtful  Titrate down the O2 (currently in 5L NC)    #COPD not on oxygen  ex-smoker, quit 30 years ago  CT chest - Diffuse, severe, panlobular emphysema is noted bilaterally, with flattening of the diaphragms  c/w supplemental oxygen and albuterol PRN  repeat Chest X-Ray to monitor symptoms in am  Budesonide ordered ( equivalent version of Advair since Advair non-formulary)    #HTN  Continue evening metoprolol succinate 50mg, and morning diltiazem CD 300mg  -confirmed that patient takes both of these per her med list    #HLD  - c/w home Simvastatin  - Zetia non-formulary ( pt's family will need to bring rx from home)    #Thrombocytopenia  - trend platelets     #DVT ppx: heparin.    Handoff Pendin) MRI left hip

## 2022-10-12 NOTE — CHART NOTE - NSCHARTNOTEFT_GEN_A_CORE
Discussed this morning with [facility] Radiologist regarding expediting hip MRI to rule in/out IT extension.     Discussed in depth with patient and son bedside regarding plan. Discussed that if there is isolated GT fracture no surgical intervention is indicated. If MRI finds there to be IT extension there is a possibility that this could be treated non-operatively with partial weight bearing. Explained that if treated non operatively there is a possibility of future displacement which would then require emergent surgery. The other option discussed would be prophylactic surgery to prevent displacement of the fracture. Patient and son understand the options and will make a decision based on MRI findings. Answered all questions.     Pending MRI of hip to determine plan for surgery vs. non operative treatment

## 2022-10-12 NOTE — PRE PROCEDURE NOTE - PRE PROCEDURE EVALUATION
ORTHOPEDIC SURGERY PRE-OP NOTE    Diagnosis: Left hip fracture  Planned Procedure: Left hip open reduction internal fixation    Consent: TO BE OBTAINED BY ATTENDING. Risks/benefits/alternatives were discussed with the patient/family and they wish to proceed with surgery.     ANTICIPATED DATE OF PROCEDURE: 10/13/22  SCHEDULED CASE OR ADD-ON CASE: Scheduled, afternoon    Clearances:   [ ] Medicine    T(C): 36.4 (10-12-22 @ 21:21), Max: 37 (10-12-22 @ 09:54)  HR: 98 (10-12-22 @ 21:21) (75 - 98)  BP: 122/61 (10-12-22 @ 21:21) (117/58 - 124/56)  RR: 18 (10-12-22 @ 21:21) (18 - 18)  SpO2: 95% (10-12-22 @ 21:21) (92% - 95%)    Labs:                        13.0   9.38  )-----------( 113      ( 12 Oct 2022 12:02 )             40.2     10-12    137  |  101  |  17  ----------------------------<  74  3.9   |  27  |  0.6<L>    Ca    8.3<L>      12 Oct 2022 12:02  Mg     2.0     10-12  TPro  6.2  /  Alb  3.5  /  TBili  1.3<H>  /  DBili  x   /  AST  35  /  ALT  27  /  AlkPhos  67  10-11  PT/INR - ( 11 Oct 2022 19:15 )   PT: 12.10 sec;   INR: 1.06 ratio    PTT - ( 11 Oct 2022 19:15 )  PTT:28.3 sec  Type and Screen x2    COVID-19 PCR: NotDetec (11 Oct 2022 14:45)  [x] EKG  [x] CXR    DIET: NPO after midnight  IVF: Per primary team    ANTICOAGULATION STATUS: Currently on SQH, discussed with primary team that PM dose is okay, please hold any AM dose in preparation for surgery. No AM dose of A/C.    A/P: 82F pending L hip ORIF tomorrow 10/13/22.    - Hold AM A/C (SQH)  - NPO and IVF @ midnight  - Type and Screen x2 required, 2u RBCs on hold for OR  - Pre-Op CBC, CMP/BMP, PT/PTT/INR, Type & Screen x2, CXR, EKG, COVID test  - Patient requires Internal Medicine pre-op clearance / risk stratification / medical optimization  - Pain control/analgesia PRN per primary team   - Incentive Spirometry   - F/U clearance  - F/U pending Labs    Notify Ortho with any questions - Spectra [phone]    [x] ABOVE WAS DISCUSSED WITH PRIMARY TEAM MEMBER: Reuben [phone]  [x] Date and Time DISCUSSED WITH PRIMARY TEAM MEMBER: 10/12/22 at 23:33

## 2022-10-12 NOTE — PROGRESS NOTE ADULT - ASSESSMENT
82 year old F w/ PMHx of COPD not on oxygen, HTN, HLD presents to the ER after a fall.    L greater trochanteric Fx  Hip Xray: Left femoral greater trochanteric fracture  CTH & c-spine negative  Pain management  Ortho consult - MRI of LLE-- NWB for now.    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

## 2022-10-13 LAB
ALBUMIN SERPL ELPH-MCNC: 3 G/DL — LOW (ref 3.5–5.2)
ALP SERPL-CCNC: 61 U/L — SIGNIFICANT CHANGE UP (ref 30–115)
ALT FLD-CCNC: 16 U/L — SIGNIFICANT CHANGE UP (ref 0–41)
ANION GAP SERPL CALC-SCNC: 10 MMOL/L — SIGNIFICANT CHANGE UP (ref 7–14)
ANION GAP SERPL CALC-SCNC: 8 MMOL/L — SIGNIFICANT CHANGE UP (ref 7–14)
APTT BLD: 32.8 SEC — SIGNIFICANT CHANGE UP (ref 27–39.2)
AST SERPL-CCNC: 16 U/L — SIGNIFICANT CHANGE UP (ref 0–41)
BASOPHILS # BLD AUTO: 0.08 K/UL — SIGNIFICANT CHANGE UP (ref 0–0.2)
BASOPHILS # BLD AUTO: 0.09 K/UL — SIGNIFICANT CHANGE UP (ref 0–0.2)
BASOPHILS NFR BLD AUTO: 0.9 % — SIGNIFICANT CHANGE UP (ref 0–1)
BASOPHILS NFR BLD AUTO: 1 % — SIGNIFICANT CHANGE UP (ref 0–1)
BILIRUB SERPL-MCNC: 0.9 MG/DL — SIGNIFICANT CHANGE UP (ref 0.2–1.2)
BUN SERPL-MCNC: 20 MG/DL — SIGNIFICANT CHANGE UP (ref 10–20)
BUN SERPL-MCNC: 24 MG/DL — HIGH (ref 10–20)
CALCIUM SERPL-MCNC: 8 MG/DL — LOW (ref 8.4–10.4)
CALCIUM SERPL-MCNC: 8.4 MG/DL — SIGNIFICANT CHANGE UP (ref 8.4–10.5)
CHLORIDE SERPL-SCNC: 99 MMOL/L — SIGNIFICANT CHANGE UP (ref 98–110)
CHLORIDE SERPL-SCNC: 99 MMOL/L — SIGNIFICANT CHANGE UP (ref 98–110)
CO2 SERPL-SCNC: 25 MMOL/L — SIGNIFICANT CHANGE UP (ref 17–32)
CO2 SERPL-SCNC: 25 MMOL/L — SIGNIFICANT CHANGE UP (ref 17–32)
CREAT SERPL-MCNC: 0.7 MG/DL — SIGNIFICANT CHANGE UP (ref 0.7–1.5)
CREAT SERPL-MCNC: 0.7 MG/DL — SIGNIFICANT CHANGE UP (ref 0.7–1.5)
EGFR: 86 ML/MIN/1.73M2 — SIGNIFICANT CHANGE UP
EGFR: 86 ML/MIN/1.73M2 — SIGNIFICANT CHANGE UP
EOSINOPHIL # BLD AUTO: 0.37 K/UL — SIGNIFICANT CHANGE UP (ref 0–0.7)
EOSINOPHIL # BLD AUTO: 0.38 K/UL — SIGNIFICANT CHANGE UP (ref 0–0.7)
EOSINOPHIL NFR BLD AUTO: 3.9 % — SIGNIFICANT CHANGE UP (ref 0–8)
EOSINOPHIL NFR BLD AUTO: 4.2 % — SIGNIFICANT CHANGE UP (ref 0–8)
GLUCOSE SERPL-MCNC: 91 MG/DL — SIGNIFICANT CHANGE UP (ref 70–99)
GLUCOSE SERPL-MCNC: 94 MG/DL — SIGNIFICANT CHANGE UP (ref 70–99)
HCT VFR BLD CALC: 37.2 % — SIGNIFICANT CHANGE UP (ref 37–47)
HCT VFR BLD CALC: 39.1 % — SIGNIFICANT CHANGE UP (ref 37–47)
HGB BLD-MCNC: 12.4 G/DL — SIGNIFICANT CHANGE UP (ref 12–16)
HGB BLD-MCNC: 13.3 G/DL — SIGNIFICANT CHANGE UP (ref 12–16)
IMM GRANULOCYTES NFR BLD AUTO: 0.2 % — SIGNIFICANT CHANGE UP (ref 0.1–0.3)
IMM GRANULOCYTES NFR BLD AUTO: 0.4 % — HIGH (ref 0.1–0.3)
INR BLD: 1.03 RATIO — SIGNIFICANT CHANGE UP (ref 0.65–1.3)
LYMPHOCYTES # BLD AUTO: 0.79 K/UL — LOW (ref 1.2–3.4)
LYMPHOCYTES # BLD AUTO: 0.91 K/UL — LOW (ref 1.2–3.4)
LYMPHOCYTES # BLD AUTO: 8.7 % — LOW (ref 20.5–51.1)
LYMPHOCYTES # BLD AUTO: 9.7 % — LOW (ref 20.5–51.1)
MAGNESIUM SERPL-MCNC: 1.8 MG/DL — SIGNIFICANT CHANGE UP (ref 1.8–2.4)
MAGNESIUM SERPL-MCNC: 1.9 MG/DL — SIGNIFICANT CHANGE UP (ref 1.8–2.4)
MCHC RBC-ENTMCNC: 30.5 PG — SIGNIFICANT CHANGE UP (ref 27–31)
MCHC RBC-ENTMCNC: 30.7 PG — SIGNIFICANT CHANGE UP (ref 27–31)
MCHC RBC-ENTMCNC: 33.3 G/DL — SIGNIFICANT CHANGE UP (ref 32–37)
MCHC RBC-ENTMCNC: 34 G/DL — SIGNIFICANT CHANGE UP (ref 32–37)
MCV RBC AUTO: 90.3 FL — SIGNIFICANT CHANGE UP (ref 81–99)
MCV RBC AUTO: 91.6 FL — SIGNIFICANT CHANGE UP (ref 81–99)
MONOCYTES # BLD AUTO: 0.76 K/UL — HIGH (ref 0.1–0.6)
MONOCYTES # BLD AUTO: 0.8 K/UL — HIGH (ref 0.1–0.6)
MONOCYTES NFR BLD AUTO: 8.1 % — SIGNIFICANT CHANGE UP (ref 1.7–9.3)
MONOCYTES NFR BLD AUTO: 8.8 % — SIGNIFICANT CHANGE UP (ref 1.7–9.3)
NEUTROPHILS # BLD AUTO: 7.05 K/UL — HIGH (ref 1.4–6.5)
NEUTROPHILS # BLD AUTO: 7.2 K/UL — HIGH (ref 1.4–6.5)
NEUTROPHILS NFR BLD AUTO: 76.9 % — HIGH (ref 42.2–75.2)
NEUTROPHILS NFR BLD AUTO: 77.2 % — HIGH (ref 42.2–75.2)
NRBC # BLD: 0 /100 WBCS — SIGNIFICANT CHANGE UP (ref 0–0)
NRBC # BLD: 0 /100 WBCS — SIGNIFICANT CHANGE UP (ref 0–0)
PLATELET # BLD AUTO: 107 K/UL — LOW (ref 130–400)
PLATELET # BLD AUTO: 107 K/UL — LOW (ref 130–400)
POTASSIUM SERPL-MCNC: 3.4 MMOL/L — LOW (ref 3.5–5)
POTASSIUM SERPL-MCNC: 3.5 MMOL/L — SIGNIFICANT CHANGE UP (ref 3.5–5)
POTASSIUM SERPL-SCNC: 3.4 MMOL/L — LOW (ref 3.5–5)
POTASSIUM SERPL-SCNC: 3.5 MMOL/L — SIGNIFICANT CHANGE UP (ref 3.5–5)
PROT SERPL-MCNC: 5 G/DL — LOW (ref 6–8)
PROTHROM AB SERPL-ACNC: 11.8 SEC — SIGNIFICANT CHANGE UP (ref 9.95–12.87)
RBC # BLD: 4.06 M/UL — LOW (ref 4.2–5.4)
RBC # BLD: 4.33 M/UL — SIGNIFICANT CHANGE UP (ref 4.2–5.4)
RBC # FLD: 12.8 % — SIGNIFICANT CHANGE UP (ref 11.5–14.5)
RBC # FLD: 12.9 % — SIGNIFICANT CHANGE UP (ref 11.5–14.5)
SODIUM SERPL-SCNC: 132 MMOL/L — LOW (ref 135–146)
SODIUM SERPL-SCNC: 134 MMOL/L — LOW (ref 135–146)
WBC # BLD: 9.12 K/UL — SIGNIFICANT CHANGE UP (ref 4.8–10.8)
WBC # BLD: 9.37 K/UL — SIGNIFICANT CHANGE UP (ref 4.8–10.8)
WBC # FLD AUTO: 9.12 K/UL — SIGNIFICANT CHANGE UP (ref 4.8–10.8)
WBC # FLD AUTO: 9.37 K/UL — SIGNIFICANT CHANGE UP (ref 4.8–10.8)

## 2022-10-13 PROCEDURE — 99233 SBSQ HOSP IP/OBS HIGH 50: CPT

## 2022-10-13 RX ORDER — HEPARIN SODIUM 5000 [USP'U]/ML
5000 INJECTION INTRAVENOUS; SUBCUTANEOUS EVERY 12 HOURS
Refills: 0 | Status: DISCONTINUED | OUTPATIENT
Start: 2022-10-13 | End: 2022-10-15

## 2022-10-13 RX ORDER — POTASSIUM CHLORIDE 20 MEQ
20 PACKET (EA) ORAL ONCE
Refills: 0 | Status: COMPLETED | OUTPATIENT
Start: 2022-10-13 | End: 2022-10-13

## 2022-10-13 RX ADMIN — Medication 81 MILLIGRAM(S): at 11:29

## 2022-10-13 RX ADMIN — HEPARIN SODIUM 5000 UNIT(S): 5000 INJECTION INTRAVENOUS; SUBCUTANEOUS at 17:22

## 2022-10-13 RX ADMIN — Medication 300 MILLIGRAM(S): at 05:28

## 2022-10-13 RX ADMIN — Medication 20 MILLIEQUIVALENT(S): at 12:00

## 2022-10-13 RX ADMIN — CHLORHEXIDINE GLUCONATE 1 APPLICATION(S): 213 SOLUTION TOPICAL at 05:34

## 2022-10-13 RX ADMIN — Medication 50 MILLIGRAM(S): at 05:29

## 2022-10-13 RX ADMIN — BUDESONIDE AND FORMOTEROL FUMARATE DIHYDRATE 2 PUFF(S): 160; 4.5 AEROSOL RESPIRATORY (INHALATION) at 20:30

## 2022-10-13 RX ADMIN — SIMVASTATIN 10 MILLIGRAM(S): 20 TABLET, FILM COATED ORAL at 21:11

## 2022-10-13 NOTE — PROGRESS NOTE ADULT - ASSESSMENT
82 year old F w/ PMHx of COPD not on oxygen, HTN, HLD presents to the ER after a fall.    L greater trochanteric Fx  Hip Xray: Left femoral greater trochanteric fracture  CTH & c-spine negative  Pain management  Ortho consult - MRI of LLE-- NWB for now.< from: MR Hip No Cont, Left (10.12.22 @ 22:03) >  Acute minimally displaced intertrochanteric fracture with extension of a   thin trabecular fracture line inferiorly to the proximal femoral   diaphysis.    Soft tissue edema surrounding the fracture.    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    Iliopsoas, gluteus minimus, gluteus medius and hamstring origin   tendinosis with partial-thickness tear of the gluteus medius and   hamstring origin.    Ortho did not want surgical intervention    Hypoxia  CTA negative for fat/pulmonary embolism but positive for diffuse, severe, panlobular emphysema is noted bilaterally, with flattening of the diaphragms  Continue supplemental oxygen at this time for now  COPD not on oxygen--ac on chr hypoxic resp failure  ex-smoker, quit 30 years ago  CT chest - Diffuse, severe, panlobular emphysema is noted bilaterally, with flattening of the diaphragms  c/w supplemental oxygen and albuterol PRN  Budesonide ordered ( equivalent version of Advair since Advair non-formulary)    HTN  Continue evening metoprolol succinate 50mg, and morning diltiazem CD 300mg  -confirmed that patient takes both of these per her med list    HLD  - c/w home Simvastatin  - Zetia non-formulary ( pt's family will need to bring rx from home)    Thrombocytopenia  - trend platelets --stable    patient walked 25 ft

## 2022-10-13 NOTE — PROGRESS NOTE ADULT - ASSESSMENT
82 year old F w/ PMHx of COPD not on oxygen, HTN, HLD presents to the ER after a fall.    #L greater trochanteric Fx  #Hip Xray: Left femoral greater trochanteric fracture  CTH & c-spine negative  Pain management  Ortho consult - transfer to [place]  Preop risk evaluation: RCRI 1 (class I risk 3.9%) METS ~4. Anguiano perioperative risk 0.0 BONITA risk. Patient denies any chest pain palpitations but does have some shortness of breath with an unclear etiology with PE ruled out. No cardiac history. CT findings consistent with severe panlobular emphysema. Patient is moderate risk for low-moderate procedure.  - MRI showing greater trochanteric fracture  - Ortho not doing any surgical intervention at this time  - PT consulted for possible candidate of 4A     #Hypoxia  CTA negative for fat/pulmonary embolism but positive for diffuse, severe, panlobular emphysema is noted bilaterally, with flattening of the diaphragms  May be slow developing pneumothorax? though doubtful  Titrate down the O2 (currently in 5L NC)    #COPD not on oxygen  ex-smoker, quit 30 years ago  CT chest - Diffuse, severe, panlobular emphysema is noted bilaterally, with flattening of the diaphragms  c/w supplemental oxygen and albuterol PRN  repeat Chest X-Ray to monitor symptoms in am  Budesonide ordered ( equivalent version of Advair since Advair non-formulary)    #HTN  Continue evening metoprolol succinate 50mg, and morning diltiazem CD 300mg  -confirmed that patient takes both of these per her med list    #HLD  - c/w home Simvastatin  - Zetia non-formulary ( pt's family will need to bring rx from home)    #Thrombocytopenia  - trend platelets     #DVT ppx: heparin.    Handoff Pendin) PT consulted >> possible 4A placement? 82 year old F w/ PMHx of COPD not on oxygen, HTN, HLD presents to the ER after a fall.    #L greater trochanteric Fx  #Hip Xray: Left femoral greater trochanteric fracture  CTH & c-spine negative  Pain management  Ortho consult - transfer to [place]  Preop risk evaluation: RCRI 1 (class I risk 3.9%) METS ~4. Anguiano perioperative risk 0.0 BONITA risk. Patient denies any chest pain palpitations but does have some shortness of breath with an unclear etiology with PE ruled out. No cardiac history. CT findings consistent with severe panlobular emphysema. Patient is moderate risk for low-moderate procedure.  - MRI showing greater trochanteric fracture  - Ortho not doing any surgical intervention at this time  - PT consulted for possible candidate of 4A     #Hypoxia  CTA negative for fat/pulmonary embolism but positive for diffuse, severe, panlobular emphysema is noted bilaterally, with flattening of the diaphragms  May be slow developing pneumothorax? though doubtful  Titrate down the O2 (currently in 5L NC)    #COPD not on oxygen  ex-smoker, quit 30 years ago  CT chest - Diffuse, severe, panlobular emphysema is noted bilaterally, with flattening of the diaphragms  c/w supplemental oxygen and albuterol PRN  repeat Chest X-Ray to monitor symptoms in am  Budesonide ordered ( equivalent version of Advair since Advair non-formulary)    #HTN  Continue evening metoprolol succinate 50mg, and morning diltiazem CD 300mg  -confirmed that patient takes both of these per her med list    #HLD  - c/w home Simvastatin  - Zetia non-formulary ( pt's family will need to bring rx from home)    #Thrombocytopenia  - trend platelets     #DVT ppx: heparin.    updated pt and son at bedside today.    Handoff Pendin) PT consulted >> possible 4A placement?

## 2022-10-13 NOTE — CHART NOTE - NSCHARTNOTEFT_GEN_A_CORE
BASED ON IMAGING THERE IS A GREATER TROCHANTERIC FX WITHOUT MEDIAL CORTEX VIOLATION   THE PT IS ABLE TO SLR AGAINST GRAVITY, NO PAIN ON AXIAL LOAD AND IS ABLE TO ACTIVELY RANGE HER HIP COMFORTABLY   WE WILL MOBILIZE THE PT WBAT WITH A WALKER AND REPEAT FILMS IN A WEEK TO 10 DAYS   NO PROPHYLACTIC ORIF AT THIS TIME   IF THE PTS CLINICAL EXAM WORSENS WE CAN RE-EVAL THE NEED FOR SURGICAL INTERVENTION AT THAT TIME.  THE  PT AND FAMILY ARE IN AGREEMENT WITH THIS PLAN   THE PRIMARY TEA HAS BEEN NOTIFIED

## 2022-10-13 NOTE — PHYSICAL THERAPY INITIAL EVALUATION ADULT - PERTINENT HX OF CURRENT PROBLEM, REHAB EVAL
83 y/o F PMHX w/ HTN, COPD, former smoker,  HLD, ( not on home O2) presenting to ED for fall last night ( pt fell onto her back on ceramic tile floor in her dining room). Pt reports she started having L hip pain as well as back & L rib pain 2/2 fall.  Pt reports she also banged her head but denies LOC. Pt notes she started to have SOB this AM, stating she would try to ambulate, but would have to stop due to pain and feeling short of breath.  Pt ems today and was found to be hypoxic to 83% on RA, placed on 6 L NC by ems. Pt states she has been experiencing throbbing pain, worse w/ movement, improved w/ rest, no radiation of pain.    MRI showed L greater trochanteric fx, no surgical intervention as per ortho. WBAT LLE

## 2022-10-13 NOTE — PHYSICAL THERAPY INITIAL EVALUATION ADULT - GENERAL OBSERVATIONS, REHAB EVAL
Patient encountered lying in bed. Son and daughter present in room. Agreed to participate in therapy.

## 2022-10-13 NOTE — PHYSICAL THERAPY INITIAL EVALUATION ADULT - ADDITIONAL COMMENTS
Patient lives alone in house with 5 steps to enter and 12 steps to bedroom. Was independent in ADL's and ambulation no AD

## 2022-10-14 LAB
ANION GAP SERPL CALC-SCNC: 10 MMOL/L — SIGNIFICANT CHANGE UP (ref 7–14)
BASOPHILS # BLD AUTO: 0.07 K/UL — SIGNIFICANT CHANGE UP (ref 0–0.2)
BASOPHILS NFR BLD AUTO: 0.9 % — SIGNIFICANT CHANGE UP (ref 0–1)
BUN SERPL-MCNC: 16 MG/DL — SIGNIFICANT CHANGE UP (ref 10–20)
CALCIUM SERPL-MCNC: 8.3 MG/DL — LOW (ref 8.4–10.5)
CHLORIDE SERPL-SCNC: 102 MMOL/L — SIGNIFICANT CHANGE UP (ref 98–110)
CO2 SERPL-SCNC: 26 MMOL/L — SIGNIFICANT CHANGE UP (ref 17–32)
CREAT SERPL-MCNC: 0.6 MG/DL — LOW (ref 0.7–1.5)
EGFR: 89 ML/MIN/1.73M2 — SIGNIFICANT CHANGE UP
EOSINOPHIL # BLD AUTO: 0.4 K/UL — SIGNIFICANT CHANGE UP (ref 0–0.7)
EOSINOPHIL NFR BLD AUTO: 5.2 % — SIGNIFICANT CHANGE UP (ref 0–8)
GLUCOSE SERPL-MCNC: 96 MG/DL — SIGNIFICANT CHANGE UP (ref 70–99)
HCT VFR BLD CALC: 38.3 % — SIGNIFICANT CHANGE UP (ref 37–47)
HGB BLD-MCNC: 12.8 G/DL — SIGNIFICANT CHANGE UP (ref 12–16)
IMM GRANULOCYTES NFR BLD AUTO: 0.3 % — SIGNIFICANT CHANGE UP (ref 0.1–0.3)
LYMPHOCYTES # BLD AUTO: 0.77 K/UL — LOW (ref 1.2–3.4)
LYMPHOCYTES # BLD AUTO: 9.9 % — LOW (ref 20.5–51.1)
MAGNESIUM SERPL-MCNC: 1.9 MG/DL — SIGNIFICANT CHANGE UP (ref 1.8–2.4)
MCHC RBC-ENTMCNC: 30.5 PG — SIGNIFICANT CHANGE UP (ref 27–31)
MCHC RBC-ENTMCNC: 33.4 G/DL — SIGNIFICANT CHANGE UP (ref 32–37)
MCV RBC AUTO: 91.2 FL — SIGNIFICANT CHANGE UP (ref 81–99)
MONOCYTES # BLD AUTO: 0.74 K/UL — HIGH (ref 0.1–0.6)
MONOCYTES NFR BLD AUTO: 9.6 % — HIGH (ref 1.7–9.3)
NEUTROPHILS # BLD AUTO: 5.74 K/UL — SIGNIFICANT CHANGE UP (ref 1.4–6.5)
NEUTROPHILS NFR BLD AUTO: 74.1 % — SIGNIFICANT CHANGE UP (ref 42.2–75.2)
NRBC # BLD: 0 /100 WBCS — SIGNIFICANT CHANGE UP (ref 0–0)
PLATELET # BLD AUTO: 121 K/UL — LOW (ref 130–400)
POTASSIUM SERPL-MCNC: 3.8 MMOL/L — SIGNIFICANT CHANGE UP (ref 3.5–5)
POTASSIUM SERPL-SCNC: 3.8 MMOL/L — SIGNIFICANT CHANGE UP (ref 3.5–5)
RBC # BLD: 4.2 M/UL — SIGNIFICANT CHANGE UP (ref 4.2–5.4)
RBC # FLD: 12.6 % — SIGNIFICANT CHANGE UP (ref 11.5–14.5)
SARS-COV-2 RNA SPEC QL NAA+PROBE: SIGNIFICANT CHANGE UP
SODIUM SERPL-SCNC: 138 MMOL/L — SIGNIFICANT CHANGE UP (ref 135–146)
WBC # BLD: 7.74 K/UL — SIGNIFICANT CHANGE UP (ref 4.8–10.8)
WBC # FLD AUTO: 7.74 K/UL — SIGNIFICANT CHANGE UP (ref 4.8–10.8)

## 2022-10-14 PROCEDURE — 99232 SBSQ HOSP IP/OBS MODERATE 35: CPT

## 2022-10-14 RX ADMIN — Medication 81 MILLIGRAM(S): at 12:12

## 2022-10-14 RX ADMIN — BUDESONIDE AND FORMOTEROL FUMARATE DIHYDRATE 2 PUFF(S): 160; 4.5 AEROSOL RESPIRATORY (INHALATION) at 22:23

## 2022-10-14 RX ADMIN — CHLORHEXIDINE GLUCONATE 1 APPLICATION(S): 213 SOLUTION TOPICAL at 05:48

## 2022-10-14 RX ADMIN — Medication 300 MILLIGRAM(S): at 05:38

## 2022-10-14 RX ADMIN — HEPARIN SODIUM 5000 UNIT(S): 5000 INJECTION INTRAVENOUS; SUBCUTANEOUS at 05:38

## 2022-10-14 RX ADMIN — SIMVASTATIN 10 MILLIGRAM(S): 20 TABLET, FILM COATED ORAL at 22:23

## 2022-10-14 RX ADMIN — HEPARIN SODIUM 5000 UNIT(S): 5000 INJECTION INTRAVENOUS; SUBCUTANEOUS at 18:14

## 2022-10-14 RX ADMIN — Medication 50 MILLIGRAM(S): at 05:38

## 2022-10-14 NOTE — CDI QUERY NOTE - NSCDIOTHERTXTBX_GEN_ALL_CORE_HH
DOCUMENTATION CLARIFICATION FORM     Encounter #: [id number]                                       Patient’s Name: Soinya Ansari  Medical Record #: [id number]                             Admit Date: 10-  : 1940                                                 Discharged:   [facility] Specialist/: Uma                            Contact #: [phone]    Dear Dr. Doe,                       Date: 10-                  The Physician’s or Provider’s documentation of the patient’s presentation, evaluation and  medical management, as identified below, may support a diagnosis that is not documented in the medical record.  In order to accurately capture all diagnoses to the greatest degree of specificity reflecting the patient’s actual severity of illness, the documentation in this patient’s medical record requires additional clarification.  Please include more specific documentation, either known or suspected, of a corresponding diagnosis associated with the clinical information described below in your Progress Note and/or Discharge Summary.    CLINICAL INDICATORS   Documentation  •	10-11 Orthopedics Consult: 82yoF... tripped and fell from ground level at her home onto her left side. Patient   had immediate pain to her left hip. Patient was still able to ambulate, but with pain.... L greater trochanteric Fx  •	10-11 through Medicine:... L greater trochanteric Fx  •	10-13 Orthopedics:... greater trochanteric fx without medial cortex violation the pt is able to slr against gravity, no pain on axial load and is able to actively range her hip comfortably we will mobilize the pt wbat with a walker and repeat films in a week to 10 days     Imaging  •	10-11 XR 2-3 View Left Hip Radiology Findings:... Bony demineralization. Left femoral greater trochanteric fracture is better visualized on same-day CT. No evidence of left hip dislocation. Degenerative changes of the hips and spine.    Treatment  •	10-11 Immobilization, WBAT  •	10-11 Pain management  •	10-11 Physical Therapy    QUERY  Based on your professional judgment and the clinical indicators, please clarify if the imaging report of bone demineralization can be further specified as:  •	Pathological fracture of left greater trochanteric associated with bone demineralization  •	Fragility fracture of left greater trochanteric associated with bone demineralization  •	Traumatic fracture left greater trochanteric associated unrelated to bone demineralization  •	Other (please specify):   •	Clinically insignificant finding bone demineralization    Documentation clarification is required for compliance, accuracy in coding and billing, and   Reporting severity of illness, quality data  and risk of mortality.  --------------------------------------------------------------------------------------------------------------------------------------------  DO NOT REMOVE THIS RECORD WITHOUT FIRST NOTIFYING THE CDI SPECIALIST  This form is NOT a part of the permanent Medical Record.

## 2022-10-14 NOTE — CONSULT NOTE ADULT - SUBJECTIVE AND OBJECTIVE BOX
HPI:  81 y/o F PMHX w/ HTN, COPD, former smoker,  HLD, ( not on home O2) presenting to ED for fall last night ( pt fell onto her back on ceramic tile floor in her dining room). Pt reports she started having L hip pain as well as back & L rib pain 2/2 fall.  Pt reports she also banged her head but denies LOC. Pt notes she started to have SOB this AM, stating she would try to ambulate, but would have to stop due to pain and feeling short of breath.  Pt ems today and was found to be hypoxic to 83% on RA, placed on 6 L NC by ems. Pt states she has been experiencing throbbing pain, worse w/ movement, improved w/ rest, no radiation of pain.      Pt denies vision changes, headaches, leg numbness/weakness, n/v, fever, chills.     L greater trochanteric Fx  Hip Xray: Left femoral greater trochanteric fracture  CTH & c-spine negative    · Note Type	Event Note  ORTHOPEDICS      BASED ON IMAGING THERE IS A GREATER TROCHANTERIC FX WITHOUT MEDIAL CORTEX VIOLATION   THE PT IS ABLE TO SLR AGAINST GRAVITY, NO PAIN ON AXIAL LOAD AND IS ABLE TO ACTIVELY RANGE HER HIP COMFORTABLY   WE WILL MOBILIZE THE PT WBAT WITH A WALKER AND REPEAT FILMS IN A WEEK TO 10 DAYS       PAST MEDICAL & SURGICAL HISTORY:  Chronic obstructive pulmonary disease      High blood cholesterol      Hypertension          Hospital Course:    TODAY'S SUBJECTIVE & REVIEW OF SYMPTOMS:     Constitutional WNL   Cardio WNL   Resp WNL   GI WNL  Heme WNL  Endo WNL  Skin WNL  MSK left hip pain  Neuro WNL  Cognitive WNL  Psych WNL      MEDICATIONS  (STANDING):  aspirin  chewable 81 milliGRAM(s) Oral daily  budesonide 160 MICROgram(s)/formoterol 4.5 MICROgram(s) Inhaler 2 Puff(s) Inhalation two times a day  chlorhexidine 2% Cloths 1 Application(s) Topical <User Schedule>  diltiazem    milliGRAM(s) Oral daily  heparin   Injectable 5000 Unit(s) SubCutaneous every 12 hours  metoprolol succinate ER 50 milliGRAM(s) Oral daily  simvastatin 10 milliGRAM(s) Oral at bedtime  tiotropium 18 MICROgram(s) Capsule 1 Capsule(s) Inhalation daily    MEDICATIONS  (PRN):  acetaminophen     Tablet .. 650 milliGRAM(s) Oral every 6 hours PRN Temp greater or equal to 38C (100.4F), Mild Pain (1 - 3)  ALBUTerol    90 MICROgram(s) HFA Inhaler 2 Puff(s) Inhalation every 6 hours PRN Shortness of Breath and/or Wheezing  aluminum hydroxide/magnesium hydroxide/simethicone Suspension 30 milliLiter(s) Oral every 4 hours PRN Dyspepsia  melatonin 3 milliGRAM(s) Oral at bedtime PRN Insomnia  ondansetron Injectable 4 milliGRAM(s) IV Push every 8 hours PRN Nausea and/or Vomiting      FAMILY HISTORY:      Allergies    penicillin (Unknown)    Intolerances        SOCIAL HISTORY:    [  ] Etoh  [  ] Smoking  [  ] Substance abuse     Home Environment:  [   ] Home Alone  [ x  ] Lives with Family  [   ] Home Health Aid    Dwelling:  [   ] Apartment  [  x ] Private House  [   ] Adult Home  [   ] Skilled Nursing Facility      [   ] Short Term  [   ] Long Term  [  x ] Stairs       Elevator [   ]    FUNCTIONAL STATUS PTA: (Check all that apply)  Ambulation: [ x   ]Independent    [   ] Dependent     [   ] Non-Ambulatory  Assistive Device: [   ] SA Cane  [   ]  Q Cane  [   ] Walker  [   ]  Wheelchair  ADL : [ x  ] Independent  [    ]  Dependent       Vital Signs Last 24 Hrs  T(C): 36.1 (14 Oct 2022 14:29), Max: 36.1 (14 Oct 2022 14:29)  T(F): 97 (14 Oct 2022 14:29), Max: 97 (14 Oct 2022 14:29)  HR: 74 (14 Oct 2022 14:29) (71 - 78)  BP: 111/55 (14 Oct 2022 14:29) (111/55 - 145/71)  BP(mean): --  RR: 18 (14 Oct 2022 14:29) (18 - 18)  SpO2: 94% (14 Oct 2022 05:14) (94% - 96%)    Parameters below as of 14 Oct 2022 05:14  Patient On (Oxygen Delivery Method): nasal cannula  O2 Flow (L/min): 2        PHYSICAL EXAM: Awake & Alert  GENERAL: NAD  HEAD:  Normocephalic  CHEST/LUNG: Clear   HEART: S1S2+  ABDOMEN: Soft, Nontender  EXTREMITIES:  no calf tenderness    NERVOUS SYSTEM:  Cranial Nerves 2-12 intact [   ] Abnormal  [   ]  ROM: WFL all extremities [   ]  Abnormal [ x  ]limited LLE  Motor Strength: WFL all extremities  [   ]  Abnormal [  x ]limited LLE  Sensation: intact to light touch [  x ] Abnormal [   ]    FUNCTIONAL STATUS:  Bed Mobility: Independent [   ]  Supervision [   ]  Needs Assistance [ x  ]  N/A [   ]  Transfers: Independent [   ]  Supervision [   ]  Needs Assistance [ x  ]  N/A [   ]   Ambulation: Independent [   ]  Supervision [   ]  Needs Assistance [ x  ]  N/A [   ]  ADL: Independent [   ] Requires Assistance [   ] N/A [   ]      LABS:                        12.8   7.74  )-----------( 121      ( 14 Oct 2022 07:43 )             38.3     10-14    138  |  102  |  16  ----------------------------<  96  3.8   |  26  |  0.6<L>    Ca    8.3<L>      14 Oct 2022 07:43  Mg     1.9     10-14    TPro  5.0<L>  /  Alb  3.0<L>  /  TBili  0.9  /  DBili  x   /  AST  16  /  ALT  16  /  AlkPhos  61  10-13    PT/INR - ( 13 Oct 2022 01:26 )   PT: 11.80 sec;   INR: 1.03 ratio         PTT - ( 13 Oct 2022 01:26 )  PTT:32.8 sec      RADIOLOGY & ADDITIONAL STUDIES:  
ORTHOPAEDIC SURGERY CONSULT NOTE    Reason for Consult: Left hip pain    HPI: 82yFemale presents with pain in left hip. Patient tripped and fell from ground level at her home onto her left side. Patient had immediate pain to her left hip. Patient was still able to ambulate, but with pain. Patient decided to come [facility] the next day as symptoms were not improving. Patient was found to have a left greater trochanter fracture of the femur. Patient was sent to [facility] for evaluation and rule out of intertrochanteric extension.  Patient endorses HT with no LOC. Denies pain elsewhere. Denies paresthesias.    PAST MEDICAL & SURGICAL HISTORY:  Chronic obstructive pulmonary disease      High blood cholesterol      Hypertension        Allergies: penicillin (Unknown)    Medications: acetaminophen     Tablet .. 650 milliGRAM(s) Oral every 6 hours PRN  ALBUTerol    90 MICROgram(s) HFA Inhaler 2 Puff(s) Inhalation every 6 hours PRN  aluminum hydroxide/magnesium hydroxide/simethicone Suspension 30 milliLiter(s) Oral every 4 hours PRN  aspirin  chewable 81 milliGRAM(s) Oral daily  budesonide 160 MICROgram(s)/formoterol 4.5 MICROgram(s) Inhaler 2 Puff(s) Inhalation two times a day  chlorhexidine 2% Cloths 1 Application(s) Topical <User Schedule>  diltiazem    milliGRAM(s) Oral daily  heparin   Injectable 5000 Unit(s) SubCutaneous every 8 hours  melatonin 3 milliGRAM(s) Oral at bedtime PRN  metoprolol succinate ER 50 milliGRAM(s) Oral daily  ondansetron Injectable 4 milliGRAM(s) IV Push every 8 hours PRN  simvastatin 10 milliGRAM(s) Oral at bedtime  tiotropium 18 MICROgram(s) Capsule 1 Capsule(s) Inhalation daily      PHYSICAL EXAM:  Vital Signs Last 24 Hrs  T(C): 36.9 (11 Oct 2022 22:00), Max: 37.3 (11 Oct 2022 13:46)  T(F): 98.4 (11 Oct 2022 22:00), Max: 99.2 (11 Oct 2022 13:46)  HR: 80 (11 Oct 2022 22:00) (80 - 101)  BP: 160/88 (11 Oct 2022 22:00) (132/74 - 160/88)  BP(mean): --  RR: 20 (11 Oct 2022 22:00) (20 - 24)  SpO2: 93% (11 Oct 2022 22:00) (93% - 96%)    Parameters below as of 11 Oct 2022 22:00  Patient On (Oxygen Delivery Method): nasal cannula  O2 Flow (L/min): 3      Physical Exam:  Alert, NAD  Resp: NLB on RA.    PELVIS: No open skin or wounds. Pelvis stable to AP and Lat compression. Able to actively SLR bilaterally.     LLE:   No open skin or wounds  TTP GT  Hip ROM intact with some limitation due to pain  Some pain with log roll  No pain with axial load  SILT DP/SP/T/Swartz/Sa.   EHL/FHL/TA/Gs motor intact.  2+ DP/PT pulses with brisk cap refill distally.  Compartments soft and compressible. No pain on passive stretch.    Labs:                        13.7   11.51 )-----------( 125      ( 11 Oct 2022 14:45 )             41.5     10-11    134<L>  |  99  |  18  ----------------------------<  106<H>  5.0   |  23  |  0.8    Ca    8.9      11 Oct 2022 14:45    TPro  6.2  /  Alb  3.5  /  TBili  1.3<H>  /  DBili  x   /  AST  35  /  ALT  27  /  AlkPhos  67  10-11    PT/INR - ( 11 Oct 2022 19:15 )   PT: 12.10 sec;   INR: 1.06 ratio         PTT - ( 11 Oct 2022 19:15 )  PTT:28.3 sec    Imaging:  XR and CT of left hip show left GT fracture. Cannot rule out intertrochanteric extension    A/P: 82y Female with left greater trochanter fracture. Recommend left hip MRI to r/u intertrochanteric extension.    - NWB LLE until MRI left hip  - Pain control  - PT

## 2022-10-14 NOTE — CONSULT NOTE ADULT - ASSESSMENT
IMPRESSION: Rehab of left hip GT fx    PRECAUTIONS: [   ] Cardiac  [   ] Respiratory  [   ] Seizures [   ] Contact Isolation  [   ] Droplet Isolation  [   ] Other    Weight Bearing Status: WBAT LLE    RECOMMENDATION:    Out of Bed to Chair     DVT/Decubiti Prophylaxis    REHAB PLAN:     [  x  ] Bedside P/T 3-5 times a week   [ x   ]   Bedside O/T  2-3 times a week             [    ] Speech Therapy               [    ]  No Rehab Therapy Indicated   Conditioning/ROM                                    ADL  Bed Mobility                                               Conditioning/ROM  Transfers                                                     Bed Mobility  Sitting /Standing Balance                         Transfers                                        Gait Training                                               Sitting/Standing Balance  Stair Training [   ]Applicable                    Home equipment Eval                                                                        Splinting  [   ] Only      GOALS:   ADL   [   x ]   Independent                    Transfers  [ x   ] Independent                          Ambulation  [ x   ] Independent     [   x  ] With device                            [    ]  CG                                                         [    ]  CG                                                                  [    ] CG                            [    ] Min A                                                   [    ] Min A                                                              [    ] Min  A          DISCHARGE PLAN:   [    ]  Good candidate for Intensive Rehabilitation/Hospital based                                             Will tolerate 3hrs Intensive Rehab Daily                                       [     ]  Short Term Rehab in Skilled Nursing Facility                                       [     ]  Home with Outpatient or VN services                                         [  x   ]  Possible Candidate for Intensive Hospital based Rehab but no 4a bed available this week / spoke with daughter and pt

## 2022-10-14 NOTE — PROGRESS NOTE ADULT - ASSESSMENT
82 year old F w/ PMHx of COPD not on oxygen, HTN, HLD presents to the ER after a fall.    #L greater trochanteric Fx  #Hip Xray: Left femoral greater trochanteric fracture  CTH & c-spine negative  Pain management  Ortho consult - transfer to [place]  Preop risk evaluation: RCRI 1 (class I risk 3.9%) METS ~4. [place]guiano perioperative risk 0.0 BONITA risk. Patient denies any chest pain palpitations but does have some shortness of breath with an unclear etiology with PE ruled out. No cardiac history. CT findings consistent with severe panlobular emphysema. Patient is moderate risk for low-moderate procedure.  - MRI showing greater trochanteric fracture  - Ortho not doing any surgical intervention at this time  - PT rec inpatient rehab  - Physiatry consulted    #Hypoxia  CTA negative for fat/pulmonary embolism but positive for diffuse, severe, panlobular emphysema is noted bilaterally, with flattening of the diaphragms  May be slow developing pneumothorax? though doubtful  Titrate down the O2    #COPD not on oxygen  ex-smoker, quit 30 years ago  CT chest - Diffuse, severe, panlobular emphysema is noted bilaterally, with flattening of the diaphragms  c/w supplemental oxygen and albuterol PRN  repeat Chest X-Ray to monitor symptoms in am  Budesonide ordered ( equivalent version of Advair since Advair non-formulary)    #HTN  Continue evening metoprolol succinate 50mg, and morning diltiazem CD 300mg  -confirmed that patient takes both of these per her med list    #HLD  - c/w home Simvastatin  - Zetia non-formulary ( pt's family will need to bring rx from home)    #Thrombocytopenia  - trend platelets     #DVT ppx: heparin.    updated pt and son at bedside today.    Handoff Pendin) Physiatry consulted for possible inpatient rehab

## 2022-10-14 NOTE — PROGRESS NOTE ADULT - ASSESSMENT
82 year old F w/ PMHx of COPD not on oxygen, HTN, HLD presents to the ER after a fall.    L greater trochanteric Fx  Hip Xray: Left femoral greater trochanteric fracture  CTH & c-spine negative  Pain management  Ortho consult - MRI of LLE-- NWB for now.< from: MR Hip No Cont, Left (10.12.22 @ 22:03) >  Acute minimally displaced intertrochanteric fracture with extension of a   thin trabecular fracture line inferiorly to the proximal femoral   diaphysis.    Soft tissue edema surrounding the fracture.    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    Iliopsoas, gluteus minimus, gluteus medius and hamstring origin   tendinosis with partial-thickness tear of the gluteus medius and   hamstring origin.    Ortho did not want surgical intervention    Hypoxia  CTA negative for fat/pulmonary embolism but positive for diffuse, severe, panlobular emphysema is noted bilaterally, with flattening of the diaphragms  Continue supplemental oxygen at this time for now  COPD not on oxygen--ac on chr hypoxic resp failure  ex-smoker, quit 30 years ago  CT chest - Diffuse, severe, panlobular emphysema is noted bilaterally, with flattening of the diaphragms  c/w supplemental oxygen and albuterol PRN  Budesonide ordered ( equivalent version of Advair since Advair non-formulary)    HTN  Continue evening metoprolol succinate 50mg, and morning diltiazem CD 300mg  -confirmed that patient takes both of these per her med list    HLD  - c/w home Simvastatin  - Zetia non-formulary ( pt's family will need to bring rx from home)    Thrombocytopenia  - trend platelets --stable    patient walked 25 ft      dc to plan to 4A/SNF

## 2022-10-15 ENCOUNTER — TRANSCRIPTION ENCOUNTER (OUTPATIENT)
Age: 82
End: 2022-10-15

## 2022-10-15 VITALS
RESPIRATION RATE: 18 BRPM | DIASTOLIC BLOOD PRESSURE: 62 MMHG | HEART RATE: 75 BPM | SYSTOLIC BLOOD PRESSURE: 129 MMHG | TEMPERATURE: 96 F | OXYGEN SATURATION: 97 %

## 2022-10-15 LAB
ANION GAP SERPL CALC-SCNC: 9 MMOL/L — SIGNIFICANT CHANGE UP (ref 7–14)
BASOPHILS # BLD AUTO: 0.06 K/UL — SIGNIFICANT CHANGE UP (ref 0–0.2)
BASOPHILS NFR BLD AUTO: 0.9 % — SIGNIFICANT CHANGE UP (ref 0–1)
BUN SERPL-MCNC: 13 MG/DL — SIGNIFICANT CHANGE UP (ref 10–20)
CALCIUM SERPL-MCNC: 8.1 MG/DL — LOW (ref 8.4–10.5)
CHLORIDE SERPL-SCNC: 101 MMOL/L — SIGNIFICANT CHANGE UP (ref 98–110)
CO2 SERPL-SCNC: 26 MMOL/L — SIGNIFICANT CHANGE UP (ref 17–32)
CREAT SERPL-MCNC: 0.6 MG/DL — LOW (ref 0.7–1.5)
EGFR: 89 ML/MIN/1.73M2 — SIGNIFICANT CHANGE UP
EOSINOPHIL # BLD AUTO: 0.3 K/UL — SIGNIFICANT CHANGE UP (ref 0–0.7)
EOSINOPHIL NFR BLD AUTO: 4.6 % — SIGNIFICANT CHANGE UP (ref 0–8)
GLUCOSE SERPL-MCNC: 89 MG/DL — SIGNIFICANT CHANGE UP (ref 70–99)
HCT VFR BLD CALC: 39.3 % — SIGNIFICANT CHANGE UP (ref 37–47)
HGB BLD-MCNC: 12.9 G/DL — SIGNIFICANT CHANGE UP (ref 12–16)
IMM GRANULOCYTES NFR BLD AUTO: 0.5 % — HIGH (ref 0.1–0.3)
LYMPHOCYTES # BLD AUTO: 0.74 K/UL — LOW (ref 1.2–3.4)
LYMPHOCYTES # BLD AUTO: 11.3 % — LOW (ref 20.5–51.1)
MAGNESIUM SERPL-MCNC: 1.8 MG/DL — SIGNIFICANT CHANGE UP (ref 1.8–2.4)
MCHC RBC-ENTMCNC: 30 PG — SIGNIFICANT CHANGE UP (ref 27–31)
MCHC RBC-ENTMCNC: 32.8 G/DL — SIGNIFICANT CHANGE UP (ref 32–37)
MCV RBC AUTO: 91.4 FL — SIGNIFICANT CHANGE UP (ref 81–99)
MONOCYTES # BLD AUTO: 0.76 K/UL — HIGH (ref 0.1–0.6)
MONOCYTES NFR BLD AUTO: 11.6 % — HIGH (ref 1.7–9.3)
NEUTROPHILS # BLD AUTO: 4.65 K/UL — SIGNIFICANT CHANGE UP (ref 1.4–6.5)
NEUTROPHILS NFR BLD AUTO: 71.1 % — SIGNIFICANT CHANGE UP (ref 42.2–75.2)
NRBC # BLD: 0 /100 WBCS — SIGNIFICANT CHANGE UP (ref 0–0)
PLATELET # BLD AUTO: 129 K/UL — LOW (ref 130–400)
POTASSIUM SERPL-MCNC: 3.8 MMOL/L — SIGNIFICANT CHANGE UP (ref 3.5–5)
POTASSIUM SERPL-SCNC: 3.8 MMOL/L — SIGNIFICANT CHANGE UP (ref 3.5–5)
RBC # BLD: 4.3 M/UL — SIGNIFICANT CHANGE UP (ref 4.2–5.4)
RBC # FLD: 12.6 % — SIGNIFICANT CHANGE UP (ref 11.5–14.5)
SODIUM SERPL-SCNC: 136 MMOL/L — SIGNIFICANT CHANGE UP (ref 135–146)
WBC # BLD: 6.54 K/UL — SIGNIFICANT CHANGE UP (ref 4.8–10.8)
WBC # FLD AUTO: 6.54 K/UL — SIGNIFICANT CHANGE UP (ref 4.8–10.8)

## 2022-10-15 PROCEDURE — 99239 HOSP IP/OBS DSCHRG MGMT >30: CPT

## 2022-10-15 RX ORDER — DILTIAZEM HCL 120 MG
1 CAPSULE, EXT RELEASE 24 HR ORAL
Qty: 0 | Refills: 0 | DISCHARGE

## 2022-10-15 RX ORDER — SIMVASTATIN 20 MG/1
1 TABLET, FILM COATED ORAL
Qty: 0 | Refills: 0 | DISCHARGE

## 2022-10-15 RX ORDER — ACETAMINOPHEN 500 MG
2 TABLET ORAL
Qty: 0 | Refills: 0 | DISCHARGE
Start: 2022-10-15

## 2022-10-15 RX ORDER — METOPROLOL TARTRATE 50 MG
50 TABLET ORAL
Qty: 0 | Refills: 0 | DISCHARGE

## 2022-10-15 RX ORDER — DILTIAZEM HCL 120 MG
0 CAPSULE, EXT RELEASE 24 HR ORAL
Qty: 0 | Refills: 0 | DISCHARGE

## 2022-10-15 RX ORDER — ASPIRIN/CALCIUM CARB/MAGNESIUM 324 MG
1 TABLET ORAL
Qty: 0 | Refills: 0 | DISCHARGE

## 2022-10-15 RX ORDER — SIMVASTATIN 20 MG/1
0 TABLET, FILM COATED ORAL
Qty: 0 | Refills: 0 | DISCHARGE

## 2022-10-15 RX ORDER — ASPIRIN/CALCIUM CARB/MAGNESIUM 324 MG
0 TABLET ORAL
Qty: 0 | Refills: 0 | DISCHARGE

## 2022-10-15 RX ADMIN — HEPARIN SODIUM 5000 UNIT(S): 5000 INJECTION INTRAVENOUS; SUBCUTANEOUS at 05:48

## 2022-10-15 RX ADMIN — CHLORHEXIDINE GLUCONATE 1 APPLICATION(S): 213 SOLUTION TOPICAL at 04:42

## 2022-10-15 RX ADMIN — Medication 50 MILLIGRAM(S): at 05:48

## 2022-10-15 RX ADMIN — Medication 300 MILLIGRAM(S): at 05:48

## 2022-10-15 RX ADMIN — Medication 81 MILLIGRAM(S): at 11:53

## 2022-10-15 NOTE — DISCHARGE NOTE NURSING/CASE MANAGEMENT/SOCIAL WORK - PATIENT PORTAL LINK FT
You can access the FollowMyHealth Patient Portal offered by [facility] by registering at the following website: [email or web address]. By joining [employer]’s FollowMyHealth portal, you will also be able to view your health information using other applications (apps) compatible with our system.

## 2022-10-15 NOTE — DISCHARGE NOTE PROVIDER - NSDCMRMEDTOKEN_GEN_ALL_CORE_FT
acetaminophen 325 mg oral tablet: 2 tab(s) orally every 6 hours, As needed, Temp greater or equal to 38C (100.4F), Mild Pain (1 - 3)  albuterol 90 mcg/inh inhalation aerosol: 2 puff(s) inhaled every 6 hours, As Needed  aspirin 81 mg oral tablet: 1 tab(s) orally once a day  Cartia  mg/24 hours oral capsule, extended release: 1 tab(s) orally once a day  Incruse Ellipta 62.5 mcg/inh inhalation powder: 1 puff(s) inhaled every 24 hours, As Needed  simvastatin 10 mg oral tablet: 1 tab(s) orally once a day  Toprol-XL: 50 milligram(s) orally once a day  WIXELA INHUB  500-50 MCG/ACT AEPB:   Zetia 10 mg oral tablet:

## 2022-10-15 NOTE — DISCHARGE NOTE PROVIDER - NSDCFUSCHEDAPPT_GEN_ALL_CORE_FT
Rashid Prater  [facility]  [facility] [address]  Scheduled Appointment: 11/29/2022

## 2022-10-15 NOTE — DISCHARGE NOTE PROVIDER - NSDCCPCAREPLAN_GEN_ALL_CORE_FT
PRINCIPAL DISCHARGE DIAGNOSIS  Diagnosis: Fracture of greater trochanter  Assessment and Plan of Treatment: You came in with a fracture of greater trochanter in your left leg. Imaging was done and orthopedics was consulted for intervention. They decided not to do any surgery at this time. You felt better and was doing good with physcail therapy. YOu were recommended for inpatient rehab but due to unavailabilty of bed, you will not be able to complete rehab here. You are discharged to nursing home where you will get physical therapy. Please come to the hospital if your condition become worse. Follow up with ortho in 10 days. Take all medications as prescribed. FOllow up with your PCP in 1 week.

## 2022-10-15 NOTE — OCCUPATIONAL THERAPY INITIAL EVALUATION ADULT - GROSSLY INTACT, SENSORY
Oriented - self; Oriented - place; Oriented - time Pt denies B/L UE paresthesias/Left UE/Right UE/Grossly Intact

## 2022-10-15 NOTE — DISCHARGE NOTE NURSING/CASE MANAGEMENT/SOCIAL WORK - NSDCVIVACCINE_GEN_ALL_CORE_FT
Influenza, seasonal, injectable, preservative free; 2018/10/8 ; Shiloh Gillis (RN);   Tdap; 16-Oct-2018 19:53; Betina Branch (RN); [employer]; [id number] (Exp. Date: 20-Aug-2022); IntraMuscular; Dorsogluteal Left.; 0.5 milliLiter(s); VIS (VIS Published: 09-May-2013, VIS Presented: 16-Oct-2018);

## 2022-10-15 NOTE — DISCHARGE NOTE PROVIDER - CARE PROVIDER_API CALL
Emmanuel Fallon (MD)  Orthopaedic Surgery; Sports Medicine  [address] [place] [address]  Phone: [phone]  Fax: [phone]  Follow Up Time:     Nick Wang)  Internal Medicine  [address] [place] [address]  Phone: [phone]  Fax: [phone]  Follow Up Time:

## 2022-10-15 NOTE — DISCHARGE NOTE PROVIDER - NSDCCPGOAL_GEN_ALL_CORE_FT
Let mother know that I received an FMLA form to fill out.  I do not know how to answer the questions on the form so there is no way I can fill it out.      I need to know if she is asking for continuous leave and if so what is the start date of the continuous leave that she is asking for.    If it is continuous leave does she know the date that she would want to be off until.      If she is not asking for continuous leave, is she asking for intermittent leave.  What would be the first day that she took off of work for intermittent leave.  How often does she need to be off of work to take Rohit to appointments or take care of him.  When she takes off work, is it for the full day or is it just for a certain number of hours per day.    I would need an estimate of how many times per month or week that she would need intermittent leave       To get better and follow your care plan as instructed.

## 2022-10-15 NOTE — PROGRESS NOTE ADULT - SUBJECTIVE AND OBJECTIVE BOX
TESSY ALANIZ 82y Female  MRN#: [id number]   Hospital Day: 1d    SUBJECTIVE  Patient is a 82y old Female who presents with a chief complaint of Currently admitted to medicine with the primary diagnosis of Fracture of greater trochanter      INTERVAL HPI AND OVERNIGHT EVENTS:  Patient was examined and seen at bedside. This morning she is resting comfortably in bed. No issues or overnight events.    OBJECTIVE  PAST MEDICAL & SURGICAL HISTORY  Chronic obstructive pulmonary disease    High blood cholesterol    Hypertension      ALLERGIES:  penicillin (Unknown)    MEDICATIONS:  STANDING MEDICATIONS  aspirin  chewable 81 milliGRAM(s) Oral daily  budesonide 160 MICROgram(s)/formoterol 4.5 MICROgram(s) Inhaler 2 Puff(s) Inhalation two times a day  chlorhexidine 2% Cloths 1 Application(s) Topical <User Schedule>  diltiazem    milliGRAM(s) Oral daily  heparin   Injectable 5000 Unit(s) SubCutaneous every 8 hours  metoprolol succinate ER 50 milliGRAM(s) Oral daily  simvastatin 10 milliGRAM(s) Oral at bedtime  tiotropium 18 MICROgram(s) Capsule 1 Capsule(s) Inhalation daily    PRN MEDICATIONS  acetaminophen     Tablet .. 650 milliGRAM(s) Oral every 6 hours PRN  ALBUTerol    90 MICROgram(s) HFA Inhaler 2 Puff(s) Inhalation every 6 hours PRN  aluminum hydroxide/magnesium hydroxide/simethicone Suspension 30 milliLiter(s) Oral every 4 hours PRN  melatonin 3 milliGRAM(s) Oral at bedtime PRN  ondansetron Injectable 4 milliGRAM(s) IV Push every 8 hours PRN      VITAL SIGNS: Last 24 Hours  T(C): 37 (12 Oct 2022 09:54), Max: 37.3 (11 Oct 2022 13:46)  T(F): 98.6 (12 Oct 2022 09:54), Max: 99.2 (11 Oct 2022 13:46)  HR: 75 (12 Oct 2022 09:54) (75 - 101)  BP: 117/58 (12 Oct 2022 09:54) (117/58 - 160/88)  BP(mean): --  RR: 20 (11 Oct 2022 22:00) (20 - 24)  SpO2: 92% (12 Oct 2022 09:54) (92% - 96%)    LABS:                        13.9   11.22 )-----------( 124      ( 12 Oct 2022 09:06 )             42.7     10-11    134<L>  |  99  |  18  ----------------------------<  106<H>  5.0   |  23  |  0.8    Ca    8.9      11 Oct 2022 14:45    TPro  6.2  /  Alb  3.5  /  TBili  1.3<H>  /  DBili  x   /  AST  35  /  ALT  27  /  AlkPhos  67  1011    PT/INR - ( 11 Oct 2022 19:15 )   PT: 12.10 sec;   INR: 1.06 ratio         PTT - ( 11 Oct 2022 19:15 )  PTT:28.3 sec  Urinalysis Basic - ( 11 Oct 2022 19:15 )    Color: Yellow / Appearance: Clear / S.010 / pH: x  Gluc: x / Ketone: Negative  / Bili: Negative / Urobili: 0.2 mg/dL   Blood: x / Protein: Negative mg/dL / Nitrite: Negative   Leuk Esterase: Small / RBC: 1-2 /HPF / WBC 3-5 /HPF   Sq Epi: x / Non Sq Epi: Occasional /HPF / Bacteria: Few        Lactate, Blood: 1.3 mmol/L (10-11-22 @ 14:45)  Creatine Kinase, Serum: 120 U/L (10-11-22 @ 14:45)  Troponin T, Serum: <0.01 ng/mL (10-11-22 @ 14:45)      CARDIAC MARKERS ( 11 Oct 2022 14:45 )  x     / <0.01 ng/mL / 120 U/L / x     / x          RADIOLOGY:      CT chest w/ IV contrast & CTAP w/ IV contrast IMPRESSION:  Diffuse, severe, panlobular emphysema is noted bilaterally.  There is a minimally displaced acute fracture of the greater trochanter   of the left hip.  Stranding is noted in the subcutaneous tissues lateral   to the left hip.    CT HEAD:  No acute intracranial findings.  Stable moderate chronic microvascular ischemic changes.    CT CERVICAL SPINE:  No acute cervical fracture or dislocation.  Multilevel severe degenerative changes, progressed since 10/6/2018.    --- End of Report ---    INGA ESQUIVEL MD; Attending Interventional Radiologist  This document has been electronically signed. Oct 11 2022  4:40PM    DUPLEX SCAN EXT VEINS LOWER BI                          PROCEDURE DATE:  10/11/2022  ******PRELIMINARY REPORT******      Impression:  No evidence of deep venous thrombosis or superficial thrombophlebitis in   the bilateral lower extremities.      ******PRELIMINARY REPORT******        PHYSICAL EXAM:  GENERAL: sitting up in bed, appearing comfortable and in NAD, A&Ox4; (+) NC   HEENT: Dry mucous membranes  NECK: Supple  CHEST/LUNG: even respirations b/l; no accessory muscle use  ABDOMEN: Soft, Nontender,   EXTREMITIES:  mild TTP of L hip c/w greater trochanter, L hip limited across all planes 2/2 pain; LLE not appearing to be shortened or rotated in appearance; no calf pain b/l
TESSY ALANIZ 82y Female  MRN#: [id number]   Hospital Day: 2d    SUBJECTIVE  Patient is a 82y old Female who presents with a chief complaint of Left hip pain and SOB (12 Oct 2022 11:04)  Currently admitted to medicine with the primary diagnosis of Fracture of greater trochanter      INTERVAL HPI AND OVERNIGHT EVENTS:  Patient was examined and seen at bedside. This morning she is resting comfortably in bed. No issues or overnight events.    OBJECTIVE  PAST MEDICAL & SURGICAL HISTORY  Chronic obstructive pulmonary disease    High blood cholesterol    Hypertension      ALLERGIES:  penicillin (Unknown)    MEDICATIONS:  STANDING MEDICATIONS  aspirin  chewable 81 milliGRAM(s) Oral daily  budesonide 160 MICROgram(s)/formoterol 4.5 MICROgram(s) Inhaler 2 Puff(s) Inhalation two times a day  chlorhexidine 2% Cloths 1 Application(s) Topical <User Schedule>  diltiazem    milliGRAM(s) Oral daily  heparin   Injectable 5000 Unit(s) SubCutaneous every 12 hours  metoprolol succinate ER 50 milliGRAM(s) Oral daily  potassium chloride    Tablet ER 20 milliEquivalent(s) Oral once  simvastatin 10 milliGRAM(s) Oral at bedtime  tiotropium 18 MICROgram(s) Capsule 1 Capsule(s) Inhalation daily    PRN MEDICATIONS  acetaminophen     Tablet .. 650 milliGRAM(s) Oral every 6 hours PRN  ALBUTerol    90 MICROgram(s) HFA Inhaler 2 Puff(s) Inhalation every 6 hours PRN  aluminum hydroxide/magnesium hydroxide/simethicone Suspension 30 milliLiter(s) Oral every 4 hours PRN  melatonin 3 milliGRAM(s) Oral at bedtime PRN  ondansetron Injectable 4 milliGRAM(s) IV Push every 8 hours PRN      VITAL SIGNS: Last 24 Hours  T(C): 36.7 (13 Oct 2022 05:00), Max: 36.7 (13 Oct 2022 05:00)  T(F): 98.1 (13 Oct 2022 05:00), Max: 98.1 (13 Oct 2022 05:00)  HR: 72 (13 Oct 2022 05:00) (72 - 98)  BP: 125/60 (13 Oct 2022 05:00) (122/61 - 125/60)  BP(mean): --  RR: 18 (13 Oct 2022 05:00) (18 - 18)  SpO2: 92% (13 Oct 2022 05:00) (92% - 95%)    LABS:                        13.3   9.12  )-----------( 107      ( 13 Oct 2022 08:56 )             39.1     10-13    134<L>  |  99  |  20  ----------------------------<  91  3.4<L>   |  25  |  0.7    Ca    8.4      13 Oct 2022 08:56  Mg     1.9     10-13    TPro  5.0<L>  /  Alb  3.0<L>  /  TBili  0.9  /  DBili  x   /  AST  16  /  ALT  16  /  AlkPhos  61  10-13    PT/INR - ( 13 Oct 2022 01:26 )   PT: 11.80 sec;   INR: 1.03 ratio         PTT - ( 13 Oct 2022 01:26 )  PTT:32.8 sec  Urinalysis Basic - ( 11 Oct 2022 19:15 )    Color: Yellow / Appearance: Clear / S.010 / pH: x  Gluc: x / Ketone: Negative  / Bili: Negative / Urobili: 0.2 mg/dL   Blood: x / Protein: Negative mg/dL / Nitrite: Negative   Leuk Esterase: Small / RBC: 1-2 /HPF / WBC 3-5 /HPF   Sq Epi: x / Non Sq Epi: Occasional /HPF / Bacteria: Few            CARDIAC MARKERS ( 11 Oct 2022 14:45 )  x     / <0.01 ng/mL / 120 U/L / x     / x          RADIOLOGY:      CT chest w/ IV contrast & CTAP w/ IV contrast IMPRESSION:  Diffuse, severe, panlobular emphysema is noted bilaterally.  There is a minimally displaced acute fracture of the greater trochanter   of the left hip.  Stranding is noted in the subcutaneous tissues lateral   to the left hip.    CT HEAD:  No acute intracranial findings.  Stable moderate chronic microvascular ischemic changes.    CT CERVICAL SPINE:  No acute cervical fracture or dislocation.  Multilevel severe degenerative changes, progressed since 10/6/2018.    --- End of Report ---    INGA ESQUIVEL MD; Attending Interventional Radiologist  This document has been electronically signed. Oct 11 2022  4:40PM    DUPLEX SCAN EXT VEINS LOWER BI                          PROCEDURE DATE:  10/11/2022  ******PRELIMINARY REPORT******      Impression:  No evidence of deep venous thrombosis or superficial thrombophlebitis in   the bilateral lower extremities.      ******PRELIMINARY REPORT******        PHYSICAL EXAM:  GENERAL: sitting up in bed, appearing comfortable and in NAD, A&Ox4; (+) NC   HEENT: Dry mucous membranes  NECK: Supple  CHEST/LUNG: even respirations b/l; no accessory muscle use  ABDOMEN: Soft, Nontender,   EXTREMITIES:  mild TTP of L hip c/w greater trochanter, L hip limited across all planes 2/2 pain; LLE not appearing to be shortened or rotated in appearance; no calf pain b/l    
SUBJECTIVE:    Patient is a 82y old Female who presents with a chief complaint of Left hip pain  (15 Oct 2022 11:27)    Currently admitted to medicine with the primary diagnosis of Fracture of greater trochanter       Today is hospital day 4d.     PAST MEDICAL & SURGICAL HISTORY  Chronic obstructive pulmonary disease    High blood cholesterol    Hypertension      ALLERGIES:  penicillin (Unknown)    MEDICATIONS:  STANDING MEDICATIONS  aspirin  chewable 81 milliGRAM(s) Oral daily  budesonide 160 MICROgram(s)/formoterol 4.5 MICROgram(s) Inhaler 2 Puff(s) Inhalation two times a day  chlorhexidine 2% Cloths 1 Application(s) Topical <User Schedule>  diltiazem    milliGRAM(s) Oral daily  heparin   Injectable 5000 Unit(s) SubCutaneous every 12 hours  metoprolol succinate ER 50 milliGRAM(s) Oral daily  simvastatin 10 milliGRAM(s) Oral at bedtime  tiotropium 18 MICROgram(s) Capsule 1 Capsule(s) Inhalation daily    PRN MEDICATIONS  acetaminophen     Tablet .. 650 milliGRAM(s) Oral every 6 hours PRN  ALBUTerol    90 MICROgram(s) HFA Inhaler 2 Puff(s) Inhalation every 6 hours PRN  aluminum hydroxide/magnesium hydroxide/simethicone Suspension 30 milliLiter(s) Oral every 4 hours PRN  melatonin 3 milliGRAM(s) Oral at bedtime PRN  ondansetron Injectable 4 milliGRAM(s) IV Push every 8 hours PRN    VITALS:   T(F): 97.7  HR: 80  BP: 112/56  RR: 181  SpO2: 97%    LABS:                        12.9   6.54  )-----------( 129      ( 15 Oct 2022 07:03 )             39.3     10-15    136  |  101  |  13  ----------------------------<  89  3.8   |  26  |  0.6<L>    Ca    8.1<L>      15 Oct 2022 07:03  Mg     1.8     10-15                    RADIOLOGY:    PHYSICAL EXAM:  GEN: No acute distress  LUNGS: Clear to auscultation bilaterally   HEART: S1/S2 present. RRR.   ABD/ GI: Soft, non-tender, non-distended. Bowel sounds present  EXT: NC/NC/NE/2+PP/MCKEON  NEURO: AAOX3    
SUBJECTIVE:    Patient is a 82y old Female who presents with a chief complaint of Left hip pain and SOB (13 Oct 2022 11:31)    Currently admitted to medicine with the primary diagnosis of Fracture of greater trochanter       Today is hospital day 2d.     PAST MEDICAL & SURGICAL HISTORY  Chronic obstructive pulmonary disease    High blood cholesterol    Hypertension      ALLERGIES:  penicillin (Unknown)    MEDICATIONS:  STANDING MEDICATIONS  aspirin  chewable 81 milliGRAM(s) Oral daily  budesonide 160 MICROgram(s)/formoterol 4.5 MICROgram(s) Inhaler 2 Puff(s) Inhalation two times a day  chlorhexidine 2% Cloths 1 Application(s) Topical <User Schedule>  diltiazem    milliGRAM(s) Oral daily  heparin   Injectable 5000 Unit(s) SubCutaneous every 12 hours  metoprolol succinate ER 50 milliGRAM(s) Oral daily  simvastatin 10 milliGRAM(s) Oral at bedtime  tiotropium 18 MICROgram(s) Capsule 1 Capsule(s) Inhalation daily    PRN MEDICATIONS  acetaminophen     Tablet .. 650 milliGRAM(s) Oral every 6 hours PRN  ALBUTerol    90 MICROgram(s) HFA Inhaler 2 Puff(s) Inhalation every 6 hours PRN  aluminum hydroxide/magnesium hydroxide/simethicone Suspension 30 milliLiter(s) Oral every 4 hours PRN  melatonin 3 milliGRAM(s) Oral at bedtime PRN  ondansetron Injectable 4 milliGRAM(s) IV Push every 8 hours PRN    VITALS:   T(F): 98  HR: 76  BP: 129/60  RR: 18  SpO2: 92%    LABS:                        13.3   9.12  )-----------( 107      ( 13 Oct 2022 08:56 )             39.1     10-13    134<L>  |  99  |  20  ----------------------------<  91  3.4<L>   |  25  |  0.7    Ca    8.4      13 Oct 2022 08:56  Mg     1.9     10-13    TPro  5.0<L>  /  Alb  3.0<L>  /  TBili  0.9  /  DBili  x   /  AST  16  /  ALT  16  /  AlkPhos  61  10-13    PT/INR - ( 13 Oct 2022 01:26 )   PT: 11.80 sec;   INR: 1.03 ratio         PTT - ( 13 Oct 2022 01:26 )  PTT:32.8 sec  Urinalysis Basic - ( 11 Oct 2022 19:15 )    Color: Yellow / Appearance: Clear / S.010 / pH: x  Gluc: x / Ketone: Negative  / Bili: Negative / Urobili: 0.2 mg/dL   Blood: x / Protein: Negative mg/dL / Nitrite: Negative   Leuk Esterase: Small / RBC: 1-2 /HPF / WBC 3-5 /HPF   Sq Epi: x / Non Sq Epi: Occasional /HPF / Bacteria: Few                RADIOLOGY:    PHYSICAL EXAM:  GEN: No acute distress  LUNGS: Clear to auscultation bilaterally   HEART: S1/S2 present. RRR.   ABD/ GI: Soft, non-tender, non-distended. Bowel sounds present  EXT: NC/NC/NE/2+PP/MCKEON  NEURO: AAOX3    
SUBJECTIVE:    Patient is a 82y old Female who presents with a chief complaint of Left hip pain and SOB (14 Oct 2022 09:45)    Currently admitted to medicine with the primary diagnosis of Fracture of greater trochanter       Today is hospital day 3d.     PAST MEDICAL & SURGICAL HISTORY  Chronic obstructive pulmonary disease    High blood cholesterol    Hypertension      ALLERGIES:  penicillin (Unknown)    MEDICATIONS:  STANDING MEDICATIONS  aspirin  chewable 81 milliGRAM(s) Oral daily  budesonide 160 MICROgram(s)/formoterol 4.5 MICROgram(s) Inhaler 2 Puff(s) Inhalation two times a day  chlorhexidine 2% Cloths 1 Application(s) Topical <User Schedule>  diltiazem    milliGRAM(s) Oral daily  heparin   Injectable 5000 Unit(s) SubCutaneous every 12 hours  metoprolol succinate ER 50 milliGRAM(s) Oral daily  simvastatin 10 milliGRAM(s) Oral at bedtime  tiotropium 18 MICROgram(s) Capsule 1 Capsule(s) Inhalation daily    PRN MEDICATIONS  acetaminophen     Tablet .. 650 milliGRAM(s) Oral every 6 hours PRN  ALBUTerol    90 MICROgram(s) HFA Inhaler 2 Puff(s) Inhalation every 6 hours PRN  aluminum hydroxide/magnesium hydroxide/simethicone Suspension 30 milliLiter(s) Oral every 4 hours PRN  melatonin 3 milliGRAM(s) Oral at bedtime PRN  ondansetron Injectable 4 milliGRAM(s) IV Push every 8 hours PRN    VITALS:   T(F): 97  HR: 74  BP: 111/55  RR: 18  SpO2: 94%    LABS:                        12.8   7.74  )-----------( 121      ( 14 Oct 2022 07:43 )             38.3     10-14    138  |  102  |  16  ----------------------------<  96  3.8   |  26  |  0.6<L>    Ca    8.3<L>      14 Oct 2022 07:43  Mg     1.9     10-14    TPro  5.0<L>  /  Alb  3.0<L>  /  TBili  0.9  /  DBili  x   /  AST  16  /  ALT  16  /  AlkPhos  61  10-13    PT/INR - ( 13 Oct 2022 01:26 )   PT: 11.80 sec;   INR: 1.03 ratio         PTT - ( 13 Oct 2022 01:26 )  PTT:32.8 sec              RADIOLOGY:    PHYSICAL EXAM:  GEN: No acute distress  LUNGS: Clear to auscultation bilaterally   HEART: S1/S2 present. RRR.   ABD/ GI: Soft, non-tender, non-distended. Bowel sounds present  EXT: NC/NC/NE/2+PP/MCKEON  NEURO: AAOX3    
SUBJECTIVE:    Patient is a 82y old Female who presents with a chief complaint of Left hip pain and SOB (12 Oct 2022 11:04)    Currently admitted to medicine with the primary diagnosis of Fracture of greater trochanter       Today is hospital day 1d.     PAST MEDICAL & SURGICAL HISTORY  Chronic obstructive pulmonary disease    High blood cholesterol    Hypertension      ALLERGIES:  penicillin (Unknown)    MEDICATIONS:  STANDING MEDICATIONS  aspirin  chewable 81 milliGRAM(s) Oral daily  budesonide 160 MICROgram(s)/formoterol 4.5 MICROgram(s) Inhaler 2 Puff(s) Inhalation two times a day  chlorhexidine 2% Cloths 1 Application(s) Topical <User Schedule>  diltiazem    milliGRAM(s) Oral daily  heparin   Injectable 5000 Unit(s) SubCutaneous every 8 hours  metoprolol succinate ER 50 milliGRAM(s) Oral daily  simvastatin 10 milliGRAM(s) Oral at bedtime  tiotropium 18 MICROgram(s) Capsule 1 Capsule(s) Inhalation daily    PRN MEDICATIONS  acetaminophen     Tablet .. 650 milliGRAM(s) Oral every 6 hours PRN  ALBUTerol    90 MICROgram(s) HFA Inhaler 2 Puff(s) Inhalation every 6 hours PRN  aluminum hydroxide/magnesium hydroxide/simethicone Suspension 30 milliLiter(s) Oral every 4 hours PRN  melatonin 3 milliGRAM(s) Oral at bedtime PRN  ondansetron Injectable 4 milliGRAM(s) IV Push every 8 hours PRN    VITALS:   T(F): 97.4  HR: 87  BP: 124/56  RR: 18  SpO2: 93%    LABS:                        13.0   9.38  )-----------( 113      ( 12 Oct 2022 12:02 )             40.2     10-12    137  |  101  |  17  ----------------------------<  74  3.9   |  27  |  0.6<L>    Ca    8.3<L>      12 Oct 2022 12:02  Mg     2.0     10-12    TPro  6.2  /  Alb  3.5  /  TBili  1.3<H>  /  DBili  x   /  AST  35  /  ALT  27  /  AlkPhos  67  10-11    PT/INR - ( 11 Oct 2022 19:15 )   PT: 12.10 sec;   INR: 1.06 ratio         PTT - ( 11 Oct 2022 19:15 )  PTT:28.3 sec  Urinalysis Basic - ( 11 Oct 2022 19:15 )    Color: Yellow / Appearance: Clear / S.010 / pH: x  Gluc: x / Ketone: Negative  / Bili: Negative / Urobili: 0.2 mg/dL   Blood: x / Protein: Negative mg/dL / Nitrite: Negative   Leuk Esterase: Small / RBC: 1-2 /HPF / WBC 3-5 /HPF   Sq Epi: x / Non Sq Epi: Occasional /HPF / Bacteria: Few            CARDIAC MARKERS ( 11 Oct 2022 14:45 )  x     / <0.01 ng/mL / 120 U/L / x     / x          RADIOLOGY:    PHYSICAL EXAM:  GEN: No acute distress  LUNGS: Clear to auscultation bilaterally   HEART: S1/S2 present. RRR.   ABD/ GI: Soft, non-tender, non-distended. Bowel sounds present  EXT: NC/NC/NE/2+PP/MCKEON  NEURO: AAOX3    
TESSY ALANIZ 82y Female  MRN#: [id number]   Hospital Day: 3d    SUBJECTIVE  Patient is a 82y old Female who presents with a chief complaint of Left hip pain and SOB (13 Oct 2022 11:31)  Currently admitted to medicine with the primary diagnosis of Fracture of greater trochanter      INTERVAL HPI AND OVERNIGHT EVENTS:  Patient was examined and seen at bedside. This morning she is resting comfortably in bed. No issues or overnight events.    OBJECTIVE  PAST MEDICAL & SURGICAL HISTORY  Chronic obstructive pulmonary disease    High blood cholesterol    Hypertension      ALLERGIES:  penicillin (Unknown)    MEDICATIONS:  STANDING MEDICATIONS  aspirin  chewable 81 milliGRAM(s) Oral daily  budesonide 160 MICROgram(s)/formoterol 4.5 MICROgram(s) Inhaler 2 Puff(s) Inhalation two times a day  chlorhexidine 2% Cloths 1 Application(s) Topical <User Schedule>  diltiazem    milliGRAM(s) Oral daily  heparin   Injectable 5000 Unit(s) SubCutaneous every 12 hours  metoprolol succinate ER 50 milliGRAM(s) Oral daily  simvastatin 10 milliGRAM(s) Oral at bedtime  tiotropium 18 MICROgram(s) Capsule 1 Capsule(s) Inhalation daily    PRN MEDICATIONS  acetaminophen     Tablet .. 650 milliGRAM(s) Oral every 6 hours PRN  ALBUTerol    90 MICROgram(s) HFA Inhaler 2 Puff(s) Inhalation every 6 hours PRN  aluminum hydroxide/magnesium hydroxide/simethicone Suspension 30 milliLiter(s) Oral every 4 hours PRN  melatonin 3 milliGRAM(s) Oral at bedtime PRN  ondansetron Injectable 4 milliGRAM(s) IV Push every 8 hours PRN      VITAL SIGNS: Last 24 Hours  T(C): 35.9 (14 Oct 2022 05:14), Max: 36.7 (13 Oct 2022 14:38)  T(F): 96.7 (14 Oct 2022 05:14), Max: 98 (13 Oct 2022 14:38)  HR: 78 (14 Oct 2022 05:14) (71 - 78)  BP: 145/71 (14 Oct 2022 05:14) (129/60 - 145/71)  BP(mean): --  RR: 18 (14 Oct 2022 05:14) (18 - 18)  SpO2: 94% (14 Oct 2022 05:14) (94% - 96%)    LABS:                        12.8   7.74  )-----------( 121      ( 14 Oct 2022 07:43 )             38.3     10-14    138  |  102  |  16  ----------------------------<  96  3.8   |  26  |  0.6<L>    Ca    8.3<L>      14 Oct 2022 07:43  Mg     1.9     10-14    TPro  5.0<L>  /  Alb  3.0<L>  /  TBili  0.9  /  DBili  x   /  AST  16  /  ALT  16  /  AlkPhos  61  10-13    PT/INR - ( 13 Oct 2022 01:26 )   PT: 11.80 sec;   INR: 1.03 ratio         PTT - ( 13 Oct 2022 01:26 )  PTT:32.8 sec              RADIOLOGY:    CT chest w/ IV contrast & CTAP w/ IV contrast IMPRESSION:  Diffuse, severe, panlobular emphysema is noted bilaterally.  There is a minimally displaced acute fracture of the greater trochanter   of the left hip.  Stranding is noted in the subcutaneous tissues lateral   to the left hip.    CT HEAD:  No acute intracranial findings.  Stable moderate chronic microvascular ischemic changes.    CT CERVICAL SPINE:  No acute cervical fracture or dislocation.  Multilevel severe degenerative changes, progressed since 10/6/2018.    --- End of Report ---    INGA ESQUIVEL MD; Attending Interventional Radiologist  This document has been electronically signed. Oct 11 2022  4:40PM    DUPLEX SCAN EXT VEINS LOWER BI                          PROCEDURE DATE:  10/11/2022  ******PRELIMINARY REPORT******      Impression:  No evidence of deep venous thrombosis or superficial thrombophlebitis in   the bilateral lower extremities.      ******PRELIMINARY REPORT******        PHYSICAL EXAM:  GENERAL: sitting up in bed, appearing comfortable and in NAD, A&Ox4; (+) NC   HEENT: Dry mucous membranes  NECK: Supple  CHEST/LUNG: even respirations b/l; no accessory muscle use  ABDOMEN: Soft, Nontender,   EXTREMITIES:  mild TTP of L hip c/w greater trochanter, L hip limited across all planes 2/2 pain; LLE not appearing to be shortened or rotated in appearance; no calf pain b/l

## 2022-10-15 NOTE — DISCHARGE NOTE NURSING/CASE MANAGEMENT/SOCIAL WORK - NSDCPEFALRISK_GEN_ALL_CORE
For information on Fall & Injury Prevention, visit: [email or web address] OR  [email or web address] OR  https://www.cdc.gov/steadi/patient.html

## 2022-10-15 NOTE — OCCUPATIONAL THERAPY INITIAL EVALUATION ADULT - PERTINENT HX OF CURRENT PROBLEM, REHAB EVAL
82yFemale presents 10/11 s/p mechanical fall at home onto her left side. Patient had immediate pain to her left hip. Patient was still able to ambulate, but with pain. Patient decided to come to the [facility] ED the next day as symptoms were not improving. Patient was found to have a left greater trochanter fracture of the femur. Patient was sent to [facility] for evaluation and rule out of intertrochanteric extension.  Patient endorses HT with no LOC. Denies pain elsewhere. Denies paresthesias. Pt found to be hypoxic by EMA associated with SOB in the setting of COPD (No home 02). Per ORTHO no surgical intervention at this time. LLE WBAT with RW

## 2022-10-15 NOTE — DISCHARGE NOTE PROVIDER - HOSPITAL COURSE
83 y/o F PMHX w/ HTN, COPD, former smoker,  HLD, ( not on home O2) presenting to ED for fall last night ( pt fell onto her back on ceramic tile floor in her dining room). Pt reports she started having L hip pain as well as back & L rib pain 2/2 fall.  Pt reports she also banged her head but denies LOC. Pt notes she started to have SOB this AM, stating she would try to ambulate, but would have to stop due to pain and feeling short of breath.  Pt ems today and was found to be hypoxic to 83% on RA, placed on 6 L NC by ems. Pt states she has been experiencing throbbing pain, worse w/ movement, improved w/ rest, no radiation of pain.      Pt denies vision changes, headaches, leg numbness/weakness, n/v, fever, chills.     Hospital course:  BASED ON IMAGING THERE IS A GREATER TROCHANTERIC FX WITHOUT MEDIAL CORTEX VIOLATION   THE PT IS ABLE TO SLR AGAINST GRAVITY, NO PAIN ON AXIAL LOAD AND IS ABLE TO ACTIVELY RANGE HER HIP COMFORTABLY   MOBILIZE THE PT WBAT WITH A WALKER AND REPEAT FILMS IN A WEEK TO 10 DAYS   NO PROPHYLACTIC ORIF AT THIS TIME     PT rec inpatient rehab but physiatry said no bed available at this time, although she is a good candidate. Pt is feeling better and doing well with PT. Pt DC to [facility] today with PT in [facility].

## 2022-10-15 NOTE — OCCUPATIONAL THERAPY INITIAL EVALUATION ADULT - GENERAL OBSERVATIONS, REHAB EVAL
Pt encountered semi reclined in bed NAD. Agreeable to OT IE. + 3 liters 02 via NC. +IV locked. Pt left in b/s chair,  chair alarm not present RN aware. + call bell in reach. 3 liters 02 via NC  in place.

## 2022-10-15 NOTE — PROGRESS NOTE ADULT - PROVIDER SPECIALTY LIST ADULT
Hospitalist
Internal Medicine
Hospitalist
Internal Medicine
Internal Medicine

## 2022-10-15 NOTE — OCCUPATIONAL THERAPY INITIAL EVALUATION ADULT - ADL RETRAINING, OT EVAL
Pt will increase LB dressing to supervision by discharge to increase active participation, independence and return to life roles.

## 2022-10-15 NOTE — OCCUPATIONAL THERAPY INITIAL EVALUATION ADULT - LEVEL OF INDEPENDENCE: DRESS LOWER BODY, OT EVAL
Pt educated on alternate technique 2* decreased ROM/ flexibility L hip and to avoid SOB/ cardiovascular strain/minimum assist (75% patients effort)

## 2022-10-15 NOTE — OCCUPATIONAL THERAPY INITIAL EVALUATION ADULT - TRANSFER TRAINING, PT EVAL
Pt will increase bed to chair transfer to distant supervision by discharge to increase independence with functional transfers

## 2022-10-15 NOTE — PROGRESS NOTE ADULT - ASSESSMENT
82 year old F w/ PMHx of COPD not on oxygen, HTN, HLD presents to the ER after a fall.    L greater trochanteric Fx  Hip Xray: Left femoral greater trochanteric fracture  CTH & c-spine negative  Ortho consult - MRI of LLE-- NWB for now.< from: MR Hip No Cont, Left (10.12.22 @ 22:03) >  Acute minimally displaced intertrochanteric fracture with extension of a   thin trabecular fracture line inferiorly to the proximal femoral   diaphysis.    Soft tissue edema surrounding the fracture.    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    Iliopsoas, gluteus minimus, gluteus medius and hamstring origin   tendinosis with partial-thickness tear of the gluteus medius and   hamstring origin.    Ortho did not want surgical intervention--patient can ambulate with PT    Hypoxia  CTA negative for fat/pulmonary embolism but positive for diffuse, severe, panlobular emphysema is noted bilaterally, with flattening of the diaphragms  Continue supplemental oxygen at this time for now  COPD not on oxygen--ac on chr hypoxic resp failure  ex-smoker, quit 30 years ago  CT chest - Diffuse, severe, panlobular emphysema is noted bilaterally, with flattening of the diaphragms  c/w supplemental oxygen and albuterol PRN  Budesonide ordered ( equivalent version of Advair since Advair non-formulary)    HTN  Continue evening metoprolol succinate 50mg, and morning diltiazem CD 300mg  -confirmed that patient takes both of these per her med list    HLD  - c/w home Simvastatin  - Zetia non-formulary ( pt's family will need to bring rx from home)    Thrombocytopenia  - trend platelets --stable    patient walked 25 ft  Dc plan to SNF-- spent more than 30mins.

## 2022-10-21 DIAGNOSIS — Z87.891 PERSONAL HISTORY OF NICOTINE DEPENDENCE: ICD-10-CM
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
## 2022-10-21 DIAGNOSIS — R09.02 HYPOXEMIA: ICD-10-CM

## 2022-10-21 DIAGNOSIS — I10 ESSENTIAL (PRIMARY) HYPERTENSION: ICD-10-CM

## 2022-10-21 DIAGNOSIS — Z79.82 LONG TERM (CURRENT) USE OF ASPIRIN: ICD-10-CM

## 2022-10-21 DIAGNOSIS — Z79.52 LONG TERM (CURRENT) USE OF SYSTEMIC STEROIDS: ICD-10-CM

## 2022-10-21 DIAGNOSIS — J96.01 ACUTE RESPIRATORY FAILURE WITH HYPOXIA: ICD-10-CM

## 2022-10-21 DIAGNOSIS — S72.112A DISPLACED FRACTURE OF GREATER TROCHANTER OF LEFT FEMUR, INITIAL ENCOUNTER FOR CLOSED FRACTURE: ICD-10-CM
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
## 2022-10-21 DIAGNOSIS — E78.00 PURE HYPERCHOLESTEROLEMIA, UNSPECIFIED: ICD-10-CM

## 2022-10-21 DIAGNOSIS — Z20.822 CONTACT WITH AND (SUSPECTED) EXPOSURE TO COVID-19: ICD-10-CM

## 2022-10-21 DIAGNOSIS — Z88.0 ALLERGY STATUS TO PENICILLIN: ICD-10-CM

## 2022-10-21 DIAGNOSIS — J43.1 PANLOBULAR EMPHYSEMA: ICD-10-CM
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
## 2022-10-21 DIAGNOSIS — D69.6 THROMBOCYTOPENIA, UNSPECIFIED: ICD-10-CM

## 2022-10-22 NOTE — PHYSICAL THERAPY INITIAL EVALUATION ADULT - TINETTI BALANCE TEST, REHAB EVAL
IV infiltrated. Numerous attempts at IV access have been made. MD made aware. Tinetti Score 12/28. High risk for falls

## 2022-11-17 NOTE — PATIENT PROFILE ADULT - HARM RISK FACTORS
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 (bupropion and sertraline and Ziprasidone)  During hospitalization, sodium level improved with 1 8% saline then switched to salt tablets 1g twice a day and increased to 1g three times a day  Work Up: Urine Na 56, Urine Osmolality 518, Serum Osmolality 255, TSH 1 3  Treatment: salt tablets 1g daily, fluid restriction <1500ml/day, torsemide 10mg daily  Plan: will repeat BMP mid December after surgery  If sodium close to 140 or above, can stop salt tablet however if below, would recommend continuing  Hypertension- antihypertensive regimen includes metoprolol 25mg daily and torsemide 10mg daily  Stay active  Avoid nonsteroidal medications  Knee Surgery- Okay from a renal standpoint for surgery  Continue salt tablet  Avoid excessive IVF  I have personally discussed the risks and benefits of the surgery from a renal standpoint with the patient in depth, and advised the patient about the risk of CHRISTOPHER and the course of CHRISTOPHER if it occurs with the small probability of the need for renal replacement therapy in the worse case scenario  Patient voiced understanding  From a renal standpoint the patient is renally optimized for surgery with the following recommendations:  Fluids to administer: Other - as per your discretion but not to exceed 250ml pre operatively   Medication Recommendations:  Minimize any IV contrast use (If IV contrast is used, please check BMP POD # 1)  Hold NSAIDs for at least 10 days prior to surgery  Hold torsemide starting on the day of surgery/procedure  Hemodynamic Recommendations:  Ideally, target MAPs greater than 65 mmHg in the perioperative period, and minimize operative time with MAPs < 65 mmHg  Avoid intraop hemodynamic instability to decrease risk for CHRISTOPHER occurrence    Other Recommendations:  Please check a BMP POD day # 1 and call if any questions or if Creatinine is rising or electrolyte abnormalities present    Follow up in 6 months  Please call the office with any questions or concerns  yes

## 2022-11-29 ENCOUNTER — APPOINTMENT (OUTPATIENT)
Age: 82
End: 2022-11-29

## 2022-11-29 VITALS
WEIGHT: 113.2 LBS | RESPIRATION RATE: 14 BRPM | HEIGHT: 60 IN | HEART RATE: 90 BPM | SYSTOLIC BLOOD PRESSURE: 140 MMHG | BODY MASS INDEX: 22.23 KG/M2 | DIASTOLIC BLOOD PRESSURE: 82 MMHG

## 2022-11-29 VITALS — OXYGEN SATURATION: 86 %

## 2022-11-29 DIAGNOSIS — R91.1 SOLITARY PULMONARY NODULE: ICD-10-CM

## 2022-11-29 PROCEDURE — 99214 OFFICE O/P EST MOD 30 MIN: CPT

## 2022-11-29 NOTE — REASON FOR VISIT
[Follow-Up] : a follow-up visit [COPD] : COPD [Pulmonary Nodules] : pulmonary nodules [TextBox_44] : The patient was recently admitted to the hospital after a fall.  She was told she fractured part of her hip but she did not need any surgery for correction.  While she was there she was placed on home oxygen therapy.  After the admission she went to rehab and was continued on the oxygen.  She presents now for follow-up.  She has been using the oxygen during exercise and during sleep at night.  She is taking her inhalers regularly.

## 2022-11-29 NOTE — ASSESSMENT
[FreeTextEntry1] : Assessment:\par COPD  \par \par plan:\par Stress compliance with inhalers. Renewed today.\par cont PRN albuterol\par cont ICS/LABA\par Continue O2 especially at night and during exercise\par Patient requesting an portable O2 concentrator\par CT chest is not indicated as she is 79 years of age\par \par F/U 6 months\par

## 2022-12-29 ENCOUNTER — TRANSCRIPTION ENCOUNTER (OUTPATIENT)
Age: 82
End: 2022-12-29

## 2023-01-09 ENCOUNTER — RX RENEWAL (OUTPATIENT)
Age: 83
End: 2023-01-09

## 2023-02-09 RX ORDER — FLUTICASONE PROPIONATE AND SALMETEROL 500; 50 UG/1; UG/1
500-50 POWDER RESPIRATORY (INHALATION) TWICE DAILY
Qty: 1 | Refills: 5 | Status: COMPLETED | COMMUNITY
End: 2023-02-09

## 2023-05-22 ENCOUNTER — APPOINTMENT (OUTPATIENT)
Dept: PULMONOLOGY | Facility: CLINIC | Age: 83
End: 2023-05-22
Payer: MEDICARE

## 2023-05-22 VITALS
DIASTOLIC BLOOD PRESSURE: 80 MMHG | HEART RATE: 89 BPM | SYSTOLIC BLOOD PRESSURE: 182 MMHG | BODY MASS INDEX: 19.63 KG/M2 | OXYGEN SATURATION: 93 % | HEIGHT: 64 IN | WEIGHT: 115 LBS

## 2023-05-22 PROCEDURE — 99213 OFFICE O/P EST LOW 20 MIN: CPT

## 2023-05-22 NOTE — HISTORY OF PRESENT ILLNESS
[TextBox_4] : I reviewed the original evaluation and last notes.\par Pulse ox was 86% on room air\par

## 2023-05-22 NOTE — REASON FOR VISIT
[Follow-Up] : a follow-up visit [COPD] : COPD [TextBox_44] : History of COPD O2 dependent.  Doing well with no recent exacerbations.  Multiple questions asked.

## 2023-05-22 NOTE — ASSESSMENT
[FreeTextEntry1] : Assessment:\par COPD  \par hypoxia. \par plan:\par Stress compliance with inhalers. Renewed today.\par cont PRN albuterol\par cont ICS/LABA\par Continue O2 especially at night and during exercise\par Patient requesting an portable O2 concentrator\par CT chest is not indicated as she is 79 years of age and  She is very poor  respiratory health\par \par F/U 6 months\par

## 2023-07-20 ENCOUNTER — RX RENEWAL (OUTPATIENT)
Age: 83
End: 2023-07-20

## 2023-08-07 RX ORDER — FLUTICASONE PROPIONATE AND SALMETEROL 500; 50 UG/1; UG/1
500-50 POWDER RESPIRATORY (INHALATION)
Qty: 60 | Refills: 3 | Status: ACTIVE | COMMUNITY
Start: 2022-09-09 | End: 1900-01-01

## 2023-09-27 ENCOUNTER — APPOINTMENT (OUTPATIENT)
Dept: PULMONOLOGY | Facility: CLINIC | Age: 83
End: 2023-09-27
Payer: MEDICARE

## 2023-09-27 VITALS
HEART RATE: 80 BPM | BODY MASS INDEX: 19.63 KG/M2 | SYSTOLIC BLOOD PRESSURE: 158 MMHG | HEIGHT: 64 IN | DIASTOLIC BLOOD PRESSURE: 82 MMHG | WEIGHT: 115 LBS | OXYGEN SATURATION: 96 %

## 2023-09-27 DIAGNOSIS — J42 UNSPECIFIED CHRONIC BRONCHITIS: ICD-10-CM

## 2023-09-27 DIAGNOSIS — R09.02 HYPOXEMIA: ICD-10-CM

## 2023-09-27 PROCEDURE — 99214 OFFICE O/P EST MOD 30 MIN: CPT

## 2023-10-17 ENCOUNTER — NON-APPOINTMENT (OUTPATIENT)
Age: 83
End: 2023-10-17

## 2023-12-19 NOTE — PATIENT PROFILE ADULT - DO YOU FEEL LIKE HURTING OTHERS?
- Ordering, reviewing, and interpreting labs, testing, and imaging  - Independently obtaining a review of systems and performing a physical exam  - Reviewing consultant documentation/recommendations in addition to discussing plan of care with consultants  - Counselling and educating patient and family regarding interpretation of aforementioned items and plan of care
no

## 2024-01-06 NOTE — PATIENT PROFILE ADULT - NSPROEDAABILITYLEARN_GEN_A_NUR
Abhishek por venir a nuestro Departamento de Emergencias hoy. Es importante recordar que algunos problemas son difíciles de diagnosticar y es posible que no se detecten tiara vines primera visita. Asegúrese de hacer un seguimiento con vines médico de atención primaria.  Si no tiene letty, puede comunicarse con el que figura en vines documentación de jessica o también puede llamar a la [facility] al [phone] para programar joan radha con letty.    Regrese a la tanvi de emergencias con cualquier pregunta / inquietud, síntomas nuevos / preocupantes, empeoramiento o falta de mejora. No conduzca ni tome decisiones importantes tiara 24 horas si ha recibido analgésicos, sedantes o fármacos que alteran el estado de ánimo tiara vines visita a la tanvi de emergencias.     none

## 2024-03-27 ENCOUNTER — APPOINTMENT (OUTPATIENT)
Dept: PULMONOLOGY | Facility: CLINIC | Age: 84
End: 2024-03-27

## 2024-05-07 ENCOUNTER — NON-APPOINTMENT (OUTPATIENT)
Age: 84
End: 2024-05-07

## 2024-05-21 ENCOUNTER — NON-APPOINTMENT (OUTPATIENT)
Age: 84
End: 2024-05-21

## 2025-05-16 NOTE — ED PROVIDER NOTE - BIRTH SEX
Copied from CRM #[id number]. Topic: MW Schedule Appointment  >> May 16, 2025  8:05 AM Eloisa BURT wrote:  --DO NOT REPLY - Sent from PACT - If sent to wrong pool, reroute to P [facility] Reroute pool --    Reason for Appointment Message: PACT unable to schedule   Reason for appointment message: [employer] KB instructs not to schedule   Reason for Visit: G44.52 (ICD-10-CM) - New persistent daily headache  R51.9 (ICD-10-CM) - Severe headache   Preferred time to be seen: as soon as possible  Callback #: [phone]  Can a detailed message be left? Yes - [employer] Message   Caller has been advised this message will be addressed within:1 business day [high priority]   Female